# Patient Record
Sex: MALE | Race: OTHER | HISPANIC OR LATINO | URBAN - METROPOLITAN AREA
[De-identification: names, ages, dates, MRNs, and addresses within clinical notes are randomized per-mention and may not be internally consistent; named-entity substitution may affect disease eponyms.]

---

## 2020-07-08 ENCOUNTER — EMERGENCY (EMERGENCY)
Facility: HOSPITAL | Age: 50
LOS: 1 days | Discharge: HOME | End: 2020-07-08
Attending: EMERGENCY MEDICINE
Payer: MEDICAID

## 2020-07-08 VITALS
TEMPERATURE: 98 F | HEART RATE: 72 BPM | RESPIRATION RATE: 20 BRPM | SYSTOLIC BLOOD PRESSURE: 154 MMHG | DIASTOLIC BLOOD PRESSURE: 87 MMHG | WEIGHT: 235.89 LBS | HEIGHT: 68 IN | OXYGEN SATURATION: 99 %

## 2020-07-08 PROCEDURE — 99282 EMERGENCY DEPT VISIT SF MDM: CPT

## 2020-07-08 NOTE — ED ADULT NURSE NOTE - NSIMPLEMENTINTERV_GEN_ALL_ED
Implemented All Universal Safety Interventions:  [place] to call system. Call bell, personal items and telephone within reach. Instruct patient to call for assistance. Room bathroom lighting operational. Non-slip footwear when patient is off stretcher. Physically safe environment: no spills, clutter or unnecessary equipment. Stretcher in lowest position, wheels locked, appropriate side rails in place.

## 2020-07-08 NOTE — ED PROVIDER NOTE - OBJECTIVE STATEMENT
49 year old male presents to ED for assistance making an outpatient appointment for a neurosurgeon.  Patient reports that he has been having left lower back pain and mid lumbar back pain x1-2 months.  Patient states that he went to his primary care provider who told him to make an appointment with a neurosurgeon based on his MRI results but states that he was unable to make an appointment so his PCP referred him to the ED for assistance in making an outpatient appointment.  Patient denies heavy lifting or  recent trauma.  Patient denies numbness or tingling in extremities, denies loss of bladder of bowel function, denies fevers or chills and denies saddle anesthesia.  Is able to ambulate. Denies any acute symptoms.    Writer spoke to MIGUEL Monreal ([phone]) who confirmed that she went the patient to the ED to get assistance in patient seeing neurosurgery. MIGUEL Monreal states that she has no acute concerns with patient and patient only needs outpatient follow-up. 49 year old male presents to ED for assistance making an outpatient appointment for a neurosurgeon.  Patient reports that he has been having left lower back pain and mid lumbar back pain x1-2 months.  Patient states that he went to his primary care provider who told him to make an appointment with a neurosurgeon based on his MRI results but states that he was unable to make an appointment so his PCP referred him to the ED for assistance in making an outpatient appointment.  Patient denies heavy lifting or  recent trauma.  Patient denies numbness or tingling in extremities, denies loss of bladder of bowel function, denies fevers or chills and denies saddle anesthesia.  Is able to ambulate. Denies any acute symptoms.    NP spoke to MIGUEL Monreal ([phone]) who confirmed that she went the patient to the ED to get assistance in patient seeing neurosurgery. MIGUEL Monreal states that she has no acute concerns with patient and patient only needs outpatient follow-up. 49 year old male presents to ED for assistance making an outpatient appointment for a neurosurgeon.  Patient reports that he has been having left lower back pain and mid lumbar back pain x1-2 months.  Patient states that he went to his primary care provider who told him to make an appointment with a neurosurgeon based on his MRI results but states that he was unable to make an appointment so his PCP referred him to the ED for assistance in making an outpatient appointment.  Patient denies heavy lifting or  recent trauma.  Patient denies numbness or tingling in extremities, denies loss of bladder of bowel function, denies fevers or chills and denies saddle anesthesia.  Is able to ambulate. Denies any acute symptoms.    Writer spoke to MIGUEL Monreal ([phone]) who confirmed that she went the patient to the ED to get assistance in patient seeing neurosurgery. MIGUEL Monreal states that she has no acute concerns with patient and patient only needs outpatient follow-up.

## 2020-07-08 NOTE — ED PROVIDER NOTE - PHYSICAL EXAMINATION
mid lumbar tenderness on palpation  5/5 strength in all extremities, able to ambulate, no neurovascular compromise.

## 2020-07-08 NOTE — ED PROVIDER NOTE - CLINICAL SUMMARY MEDICAL DECISION MAKING FREE TEXT BOX
Patient presents for assistance getting neurosurgery appointment.  No acute concerns.  Will arange for care coordinator to contact patient as well as provide patient with list of neurosurgeons. Writer endorsed to MIGUEL Monreal this plan and she is agreeable.  Patient also agreeable to plan. Patient presents for assistance getting neurosurgery appointment.  No acute concerns.  Will arrange for care coordinator to contact patient as well as provide patient with list of neurosurgeons. Writer endorsed to MIGUEL Monreal this plan and she is agreeable.  Patient also agreeable to plan.

## 2020-07-08 NOTE — ED ADULT TRIAGE NOTE - CHIEF COMPLAINT QUOTE
Lt lower back pain worsening x2 months, pt states "i'm unable to follow up with orthopedic doctor" Lt lower back pain worsening x2 months, pt states "I need neuro surgery but i'm unable to follow up with specialist"

## 2020-07-08 NOTE — ED PROVIDER NOTE - ATTENDING CONTRIBUTION TO CARE
50 yo M presents to ED for assistance for arranging neurosurgery followup for chronic low back pain. Was sent in by FNP and patient with MRI report showing multiple bulging discs. F/u arranged for patient and referrals placed for care coordinator. patient without acute pain at this time. Nontoxic and medically stable for discharge. Return precautions provided and patient understands to return to the ED for worsening signs and symptoms.     Dr. CASANDRA Patrick:  I have independently evaluated the patient and have documented in the appropriate sections above.  I agree with the exam and plan as noted above.

## 2020-07-08 NOTE — ED PROVIDER NOTE - PATIENT PORTAL LINK FT
You can access the FollowMyHealth Patient Portal offered by [facility] by registering at the following website: [email or web address]. By joining [employer]’s FollowMyHealth portal, you will also be able to view your health information using other applications (apps) compatible with our system.

## 2021-03-26 NOTE — ED ADULT NURSE NOTE - EXTENSIONS OF SELF_ADULT
[FreeTextEntry1] : PCP: Dr. Foley\par Pt goes by: Harsh\par grandmother: Adrianna Chino\par \par This is a 25 year-old RH male with a history of polysubstance abuse (last smoked fentanyl and abused Xanax 4-6mg/day in Dec 2020, still smoking cannabis), HTN, RUE thrombus during prolonged hospitalization 12/2020 (was on AC, but not discharged with AC) who is referred by PCP Dr. Foley for evaluation and management of seizure-like activity. Pt today is accompanied by his grandmother. \par \par Pt has a long history of fentanyl and Xanax abuse. About 4-5 years ago, whenever he abruptly comes off of the two drugs, he would have these seizure-like episodes consisted of whole-body shakiness, eyes rolled up, jaws locking up, but without impairment of awareness. These episodes would occur up to few minutes at a time, up to 3-4 a day. He has been told that this is “dystonia” secondary to drug withdrawal.\par \par Last December, pt had a prolonged and complicated hospitalization in [place] with sepsis secondary to severe case of pneumonia and pneumothorax. Pt was deprived of fentanyl and Xanax during this hospitalization, so he again exhibited similar episodes of seizure-like events. He was therefore started on VPA for presumed seizure. Also developed RUE thrombus (unclear arterial VS venous) during this hospitalization. Pt was treated with anticoagulant in the hospital, but he was not instructed to continue this outpatient. \par \par Pt denied ever other seizure-like events, including staring spells, lapse of time, LOC, involuntary focal jerking/shaking, convulsion, metallic/foul smell or taste, GI rising sensation, intense gely vu, unexplained anxiety or fear.\par \par SEIZURE RISK FACTORS:\par Polysubstance abuse and withdrawal. Patient was a product of normal pregnancy and delivery. No history of febrile seizure, TBI, CNS infection, or FH of seizures.\par \par CURRENT AEDs:\par GBP 400mg TID - 12/2020, for polysubstance addiction\par VPA DR 1500mg BID - 12/2020, for presumed seizure\par \par PREVIOUS AED: \par none\par \par IMAGING: \par Not sure if ever had MRI\par \par NEUROPHYSIOLOGY:\par rEEG 3/25/21 (370): mild gen slowing\par \par NEUROPSYCHOLOGY: \par none\par \par PMH:\par polysubstance abuse\par HTN\par \par PSH:\par s/p thoracostomy and removal\par s/p tracheostomy and removal\par s/p PEG and removal None

## 2022-12-20 NOTE — ED ADULT NURSE NOTE - CHPI ED NUR QUALITY2
Please see refill request, will eprescribe upon approval.  Pt is taking 2 capsules at bed time.  Pt is asking for 60 capsules.   deep pain

## 2023-10-19 NOTE — ED ADULT NURSE NOTE - CAS TRG GENERAL NORM CIRC DET
Strong peripheral pulses You can access the FollowMyHealth Patient Portal offered by [facility] by registering at the following website: [email or web address]. By joining [employer]’s FollowMyHealth portal, you will also be able to view your health information using other applications (apps) compatible with our system.

## 2023-12-10 ENCOUNTER — INPATIENT (INPATIENT)
Facility: HOSPITAL | Age: 53
LOS: 0 days | Discharge: HOME | DRG: 74 | End: 2023-12-11
Attending: INTERNAL MEDICINE | Admitting: INTERNAL MEDICINE
Payer: MEDICAID

## 2023-12-10 VITALS
HEART RATE: 65 BPM | WEIGHT: 248.9 LBS | SYSTOLIC BLOOD PRESSURE: 151 MMHG | RESPIRATION RATE: 18 BRPM | OXYGEN SATURATION: 98 % | TEMPERATURE: 99 F | DIASTOLIC BLOOD PRESSURE: 87 MMHG

## 2023-12-10 DIAGNOSIS — R74.01 ELEVATION OF LEVELS OF LIVER TRANSAMINASE LEVELS: ICD-10-CM

## 2023-12-10 DIAGNOSIS — Z29.9 ENCOUNTER FOR PROPHYLACTIC MEASURES, UNSPECIFIED: ICD-10-CM

## 2023-12-10 DIAGNOSIS — I63.9 CEREBRAL INFARCTION, UNSPECIFIED: ICD-10-CM

## 2023-12-10 DIAGNOSIS — R29.810 FACIAL WEAKNESS: ICD-10-CM

## 2023-12-10 DIAGNOSIS — G51.0 BELL'S PALSY: ICD-10-CM

## 2023-12-10 LAB
ALBUMIN SERPL ELPH-MCNC: 3.5 G/DL — SIGNIFICANT CHANGE UP (ref 3.5–5)
ALBUMIN SERPL ELPH-MCNC: 3.5 G/DL — SIGNIFICANT CHANGE UP (ref 3.5–5)
ALP SERPL-CCNC: 121 U/L — HIGH (ref 40–120)
ALP SERPL-CCNC: 121 U/L — HIGH (ref 40–120)
ALT FLD-CCNC: 97 U/L DA — HIGH (ref 10–60)
ALT FLD-CCNC: 97 U/L DA — HIGH (ref 10–60)
ANION GAP SERPL CALC-SCNC: 5 MMOL/L — SIGNIFICANT CHANGE UP (ref 5–17)
ANION GAP SERPL CALC-SCNC: 5 MMOL/L — SIGNIFICANT CHANGE UP (ref 5–17)
APTT BLD: 41.3 SEC — HIGH (ref 24.5–35.6)
APTT BLD: 41.3 SEC — HIGH (ref 24.5–35.6)
AST SERPL-CCNC: 49 U/L — HIGH (ref 10–40)
AST SERPL-CCNC: 49 U/L — HIGH (ref 10–40)
BASOPHILS # BLD AUTO: 0.04 K/UL — SIGNIFICANT CHANGE UP (ref 0–0.2)
BASOPHILS # BLD AUTO: 0.04 K/UL — SIGNIFICANT CHANGE UP (ref 0–0.2)
BASOPHILS NFR BLD AUTO: 0.5 % — SIGNIFICANT CHANGE UP (ref 0–2)
BASOPHILS NFR BLD AUTO: 0.5 % — SIGNIFICANT CHANGE UP (ref 0–2)
BILIRUB SERPL-MCNC: 0.4 MG/DL — SIGNIFICANT CHANGE UP (ref 0.2–1.2)
BILIRUB SERPL-MCNC: 0.4 MG/DL — SIGNIFICANT CHANGE UP (ref 0.2–1.2)
BUN SERPL-MCNC: 10 MG/DL — SIGNIFICANT CHANGE UP (ref 7–18)
BUN SERPL-MCNC: 10 MG/DL — SIGNIFICANT CHANGE UP (ref 7–18)
CALCIUM SERPL-MCNC: 8.3 MG/DL — LOW (ref 8.4–10.5)
CALCIUM SERPL-MCNC: 8.3 MG/DL — LOW (ref 8.4–10.5)
CHLORIDE SERPL-SCNC: 107 MMOL/L — SIGNIFICANT CHANGE UP (ref 96–108)
CHLORIDE SERPL-SCNC: 107 MMOL/L — SIGNIFICANT CHANGE UP (ref 96–108)
CO2 SERPL-SCNC: 25 MMOL/L — SIGNIFICANT CHANGE UP (ref 22–31)
CO2 SERPL-SCNC: 25 MMOL/L — SIGNIFICANT CHANGE UP (ref 22–31)
CREAT SERPL-MCNC: 0.76 MG/DL — SIGNIFICANT CHANGE UP (ref 0.5–1.3)
CREAT SERPL-MCNC: 0.76 MG/DL — SIGNIFICANT CHANGE UP (ref 0.5–1.3)
EGFR: 110 ML/MIN/1.73M2 — SIGNIFICANT CHANGE UP
EGFR: 110 ML/MIN/1.73M2 — SIGNIFICANT CHANGE UP
EOSINOPHIL # BLD AUTO: 0.23 K/UL — SIGNIFICANT CHANGE UP (ref 0–0.5)
EOSINOPHIL # BLD AUTO: 0.23 K/UL — SIGNIFICANT CHANGE UP (ref 0–0.5)
EOSINOPHIL NFR BLD AUTO: 3.1 % — SIGNIFICANT CHANGE UP (ref 0–6)
EOSINOPHIL NFR BLD AUTO: 3.1 % — SIGNIFICANT CHANGE UP (ref 0–6)
GLUCOSE SERPL-MCNC: 152 MG/DL — HIGH (ref 70–99)
GLUCOSE SERPL-MCNC: 152 MG/DL — HIGH (ref 70–99)
HCT VFR BLD CALC: 42 % — SIGNIFICANT CHANGE UP (ref 39–50)
HCT VFR BLD CALC: 42 % — SIGNIFICANT CHANGE UP (ref 39–50)
HGB BLD-MCNC: 14.3 G/DL — SIGNIFICANT CHANGE UP (ref 13–17)
HGB BLD-MCNC: 14.3 G/DL — SIGNIFICANT CHANGE UP (ref 13–17)
IMM GRANULOCYTES NFR BLD AUTO: 0.3 % — SIGNIFICANT CHANGE UP (ref 0–0.9)
IMM GRANULOCYTES NFR BLD AUTO: 0.3 % — SIGNIFICANT CHANGE UP (ref 0–0.9)
INR BLD: 1.07 RATIO — SIGNIFICANT CHANGE UP (ref 0.85–1.18)
INR BLD: 1.07 RATIO — SIGNIFICANT CHANGE UP (ref 0.85–1.18)
LYMPHOCYTES # BLD AUTO: 3.62 K/UL — HIGH (ref 1–3.3)
LYMPHOCYTES # BLD AUTO: 3.62 K/UL — HIGH (ref 1–3.3)
LYMPHOCYTES # BLD AUTO: 48.5 % — HIGH (ref 13–44)
LYMPHOCYTES # BLD AUTO: 48.5 % — HIGH (ref 13–44)
MCHC RBC-ENTMCNC: 28.1 PG — SIGNIFICANT CHANGE UP (ref 27–34)
MCHC RBC-ENTMCNC: 28.1 PG — SIGNIFICANT CHANGE UP (ref 27–34)
MCHC RBC-ENTMCNC: 34 GM/DL — SIGNIFICANT CHANGE UP (ref 32–36)
MCHC RBC-ENTMCNC: 34 GM/DL — SIGNIFICANT CHANGE UP (ref 32–36)
MCV RBC AUTO: 82.7 FL — SIGNIFICANT CHANGE UP (ref 80–100)
MCV RBC AUTO: 82.7 FL — SIGNIFICANT CHANGE UP (ref 80–100)
MONOCYTES # BLD AUTO: 0.46 K/UL — SIGNIFICANT CHANGE UP (ref 0–0.9)
MONOCYTES # BLD AUTO: 0.46 K/UL — SIGNIFICANT CHANGE UP (ref 0–0.9)
MONOCYTES NFR BLD AUTO: 6.2 % — SIGNIFICANT CHANGE UP (ref 2–14)
MONOCYTES NFR BLD AUTO: 6.2 % — SIGNIFICANT CHANGE UP (ref 2–14)
NEUTROPHILS # BLD AUTO: 3.09 K/UL — SIGNIFICANT CHANGE UP (ref 1.8–7.4)
NEUTROPHILS # BLD AUTO: 3.09 K/UL — SIGNIFICANT CHANGE UP (ref 1.8–7.4)
NEUTROPHILS NFR BLD AUTO: 41.4 % — LOW (ref 43–77)
NEUTROPHILS NFR BLD AUTO: 41.4 % — LOW (ref 43–77)
NRBC # BLD: 0 /100 WBCS — SIGNIFICANT CHANGE UP (ref 0–0)
NRBC # BLD: 0 /100 WBCS — SIGNIFICANT CHANGE UP (ref 0–0)
PLATELET # BLD AUTO: 294 K/UL — SIGNIFICANT CHANGE UP (ref 150–400)
PLATELET # BLD AUTO: 294 K/UL — SIGNIFICANT CHANGE UP (ref 150–400)
POTASSIUM SERPL-MCNC: 3.6 MMOL/L — SIGNIFICANT CHANGE UP (ref 3.5–5.3)
POTASSIUM SERPL-MCNC: 3.6 MMOL/L — SIGNIFICANT CHANGE UP (ref 3.5–5.3)
POTASSIUM SERPL-SCNC: 3.6 MMOL/L — SIGNIFICANT CHANGE UP (ref 3.5–5.3)
POTASSIUM SERPL-SCNC: 3.6 MMOL/L — SIGNIFICANT CHANGE UP (ref 3.5–5.3)
PROT SERPL-MCNC: 7.6 G/DL — SIGNIFICANT CHANGE UP (ref 6–8.3)
PROT SERPL-MCNC: 7.6 G/DL — SIGNIFICANT CHANGE UP (ref 6–8.3)
PROTHROM AB SERPL-ACNC: 12.2 SEC — SIGNIFICANT CHANGE UP (ref 9.5–13)
PROTHROM AB SERPL-ACNC: 12.2 SEC — SIGNIFICANT CHANGE UP (ref 9.5–13)
RBC # BLD: 5.08 M/UL — SIGNIFICANT CHANGE UP (ref 4.2–5.8)
RBC # BLD: 5.08 M/UL — SIGNIFICANT CHANGE UP (ref 4.2–5.8)
RBC # FLD: 13.4 % — SIGNIFICANT CHANGE UP (ref 10.3–14.5)
RBC # FLD: 13.4 % — SIGNIFICANT CHANGE UP (ref 10.3–14.5)
SODIUM SERPL-SCNC: 137 MMOL/L — SIGNIFICANT CHANGE UP (ref 135–145)
SODIUM SERPL-SCNC: 137 MMOL/L — SIGNIFICANT CHANGE UP (ref 135–145)
TROPONIN I, HIGH SENSITIVITY RESULT: 33.4 NG/L — SIGNIFICANT CHANGE UP
TROPONIN I, HIGH SENSITIVITY RESULT: 33.4 NG/L — SIGNIFICANT CHANGE UP
WBC # BLD: 7.46 K/UL — SIGNIFICANT CHANGE UP (ref 3.8–10.5)
WBC # BLD: 7.46 K/UL — SIGNIFICANT CHANGE UP (ref 3.8–10.5)
WBC # FLD AUTO: 7.46 K/UL — SIGNIFICANT CHANGE UP (ref 3.8–10.5)
WBC # FLD AUTO: 7.46 K/UL — SIGNIFICANT CHANGE UP (ref 3.8–10.5)

## 2023-12-10 PROCEDURE — 99285 EMERGENCY DEPT VISIT HI MDM: CPT

## 2023-12-10 PROCEDURE — 70498 CT ANGIOGRAPHY NECK: CPT | Mod: 26,MA

## 2023-12-10 PROCEDURE — 70496 CT ANGIOGRAPHY HEAD: CPT | Mod: 26,MA

## 2023-12-10 PROCEDURE — 0042T: CPT | Mod: MA

## 2023-12-10 PROCEDURE — 70450 CT HEAD/BRAIN W/O DYE: CPT | Mod: 26,59,MA

## 2023-12-10 RX ORDER — ASPIRIN/CALCIUM CARB/MAGNESIUM 324 MG
324 TABLET ORAL ONCE
Refills: 0 | Status: COMPLETED | OUTPATIENT
Start: 2023-12-10 | End: 2023-12-10

## 2023-12-10 RX ORDER — ATORVASTATIN CALCIUM 80 MG/1
80 TABLET, FILM COATED ORAL AT BEDTIME
Refills: 0 | Status: DISCONTINUED | OUTPATIENT
Start: 2023-12-10 | End: 2023-12-11

## 2023-12-10 RX ORDER — ASPIRIN/CALCIUM CARB/MAGNESIUM 324 MG
300 TABLET ORAL DAILY
Refills: 0 | Status: DISCONTINUED | OUTPATIENT
Start: 2023-12-10 | End: 2023-12-10

## 2023-12-10 RX ORDER — ACETAMINOPHEN 500 MG
650 TABLET ORAL EVERY 6 HOURS
Refills: 0 | Status: DISCONTINUED | OUTPATIENT
Start: 2023-12-10 | End: 2023-12-11

## 2023-12-10 RX ORDER — ASPIRIN/CALCIUM CARB/MAGNESIUM 324 MG
81 TABLET ORAL DAILY
Refills: 0 | Status: DISCONTINUED | OUTPATIENT
Start: 2023-12-11 | End: 2023-12-11

## 2023-12-10 RX ORDER — ENOXAPARIN SODIUM 100 MG/ML
40 INJECTION SUBCUTANEOUS EVERY 24 HOURS
Refills: 0 | Status: DISCONTINUED | OUTPATIENT
Start: 2023-12-10 | End: 2023-12-11

## 2023-12-10 RX ORDER — VALACYCLOVIR 500 MG/1
1000 TABLET, FILM COATED ORAL EVERY 8 HOURS
Refills: 0 | Status: DISCONTINUED | OUTPATIENT
Start: 2023-12-10 | End: 2023-12-11

## 2023-12-10 RX ORDER — OXYCODONE HYDROCHLORIDE 5 MG/1
2.5 TABLET ORAL ONCE
Refills: 0 | Status: DISCONTINUED | OUTPATIENT
Start: 2023-12-10 | End: 2023-12-10

## 2023-12-10 RX ADMIN — OXYCODONE HYDROCHLORIDE 2.5 MILLIGRAM(S): 5 TABLET ORAL at 19:03

## 2023-12-10 RX ADMIN — VALACYCLOVIR 1000 MILLIGRAM(S): 500 TABLET, FILM COATED ORAL at 19:00

## 2023-12-10 RX ADMIN — Medication 324 MILLIGRAM(S): at 18:00

## 2023-12-10 RX ADMIN — ATORVASTATIN CALCIUM 80 MILLIGRAM(S): 80 TABLET, FILM COATED ORAL at 21:11

## 2023-12-10 RX ADMIN — Medication 60 MILLIGRAM(S): at 19:00

## 2023-12-10 RX ADMIN — OXYCODONE HYDROCHLORIDE 2.5 MILLIGRAM(S): 5 TABLET ORAL at 18:00

## 2023-12-10 NOTE — H&P ADULT - PROBLEM SELECTOR PLAN 1
p/w Rt sided facial weakness with upper part of the face involved  CT head neg for acute findings  CTA: 4 mL of tissue at risk for infarction in the right temporal lobe on CT perfusion. Normal CTA of the head and neck. No large vessel occlusion.   CVA vs Bell's palsy   s/p ASA  c/w ASA, Atorvastatin   dysphagia screen passed  c/w tele, vitals and neuro checks q4  f/u lipid panel, HbA1c, TSH  f/u Echo w/ bubble study  Neuro Dr. Carr consult p/w Rt sided facial weakness with upper part of the face involved  CT head neg for acute findings  CTA: 4 mL of tissue at risk for infarction in the right temporal lobe on CT perfusion. Normal CTA of the head and neck. No large vessel occlusion.   CVA vs Bell's palsy   s/p ASA  c/w ASA, Atorvastatin   dysphagia screen passed  c/w tele, vitals and neuro checks q4  f/u lipid panel, HbA1c, TSH  f/u Echo w/ bubble study  Neuro Dr. Carr consulted

## 2023-12-10 NOTE — H&P ADULT - PROBLEM SELECTOR PLAN 2
p/w Rt sided facial weakness with upper part of the face involved  likely Bell's palsy   c/w Prednisone 60 mg daily for 7 days  c/w Valacyclovir 1000 mg TID for 7 days

## 2023-12-10 NOTE — ED ADULT NURSE NOTE - EXTENSIONS OF SELF_ADULT
Had leak of vascular stent. This was repaired by IR. Subsequently developed pancreatitis hospitalized GB evaluation was negative No EtOH On HCT for BP on metformin last visit HCT was stopped due to pancreatitis 11/19/2020 constipation  took laxative now cramping and loose stool wt loss 18 pounds no n/v decreased appetite None

## 2023-12-10 NOTE — ED PROVIDER NOTE - CRANIAL NERVE AND PUPILLARY EXAM
right facial droop. remainder of cranial nerves intact./central and peripheral vision intact/extra-ocular movements intact/tongue is midline

## 2023-12-10 NOTE — H&P ADULT - NSHPREVIEWOFSYSTEMS_GEN_ALL_CORE
REVIEW OF SYSTEMS:    CONSTITUTIONAL: No weakness, fevers or chills  EYES/ENT: No visual changes;  No vertigo or throat pain   NECK: No pain or stiffness  RESPIRATORY: No cough, wheezing, hemoptysis; No shortness of breath  CARDIOVASCULAR: No chest pain or palpitations  GASTROINTESTINAL: No abdominal or epigastric pain. No nausea, vomiting, or hematemesis; No diarrhea or constipation. No melena or hematochezia.  GENITOURINARY: No dysuria, frequency or hematuria  NEUROLOGICAL: No numbness or weakness in the UE/LE, + facial droop on Rt side affecting the eye, no slurred speech   SKIN: No itching, rashes

## 2023-12-10 NOTE — H&P ADULT - HISTORY OF PRESENT ILLNESS
52 yrs old M, from home, ambulating independently, pmhx of HLD not on medication, pshx appendectomy, presented with Rt sided facial weakness, and posterior headache. Pt endorsed having right sided facial weakness, including decreased strength at the level of the eye unable to fully close along with facial droop noticed by his wife at about 3 PM. He reported of having dull headache in the posterior side of the head behind his right ear for the past 3 days without improvement on ibuprofen. Pt denied having rash on the area of pain, trouble swallowing, slurring of speech, decreased sensation or strength in the upper or lower extremity. chest pain, palpitation, dyspnea, abdominal pain, N/V/D, dysuria/ increased frequency or urgency. He denied hx of lyme disease, camping or visiting [place]y area, recent travel or sick contacts, could not recall if had chicken pox in childhood, endorsed having chronic weak urinary stream and difficulty with complete evacuation.   Family history postive for DM in [place]. He reported consuming alcohol socially, smoking 1-2 cigarettes once a month, denied marijuana or illicit drug use.

## 2023-12-10 NOTE — H&P ADULT - NSHPLABSRESULTS_GEN_ALL_CORE
ACC: [id number] EXAM:  CT ANGIO BRAIN STROKE PROTC IC   ORDERED BY: HINA SIMS     ACC: [id number] EXAM:  CT ANGIO NECK STROKE PROTCL IC   ORDERED BY: HINA SIMS     ACC: [id number] EXAM:  CT BRAIN PERFUSION MAPS STROKE   ORDERED BY: HINA SIMS     PROCEDURE DATE:  12/10/2023          INTERPRETATION:  Clinical indication: Code stroke    CT PERFUSION:    After the intravenous administration of 40 cc of Omnipaque 350 serial   thin sections were obtained through the brain for the purposes of  evaluating CT perfusion.    Rapid images were sent to the rapid ischemia view software for   postprocessing.    No core infarct is identified. There is 4 cc of tissue with delayed mean   transit time in the right temporal lobe.    CBF<30% volume: 0ml  Tmax. 6.0s volume; 4 ml  Mismatch volume:  4 ml  Mismatch ratio:infinite    CTA head and neck:        After the intravenous power injection of 90 cc of Omnipaque 350 using a   bolus sharda timing run serial thin sections were obtained through the   neck from the thoracic inlet through the intracranial circulation   centered at the [id number]-hx-Oelkjj on a multislice CT scanner reformatted   with coronal and sagittal 2 D-MIP projections, including 3 D   reconstructions using a separate 3D Vit[employer] software workstation.A total   of   cc of Omnipaque were intravenously injected. 70  cc were discarded.        The origins of the carotid and vertebral arteries are normal. The   vertebral arteries are codominant. The carotid bifurcations are normal,   the left internal carotid artery takes a retropharyngeal course which is   a normal variation.     The distal vertebral arteries are well identified as are the   posterior-inferior cerebellar arteries bilaterally. The region of the   vertebral basilar junction is normal. The basilar artery is fenestrated   without an aneurysm. The posterior cerebral and superior cerebellar   arteries are normal.    Distal cervical, petrous cavernous and supraclinoid internal carotid   arteries. The anterior cerebral arteries, anterior communicating artery   and middle cerebral arteries are normal.      There is no evidence of aneurysm, stenosis, or vessel occlusion.    The normal intracranial venous circulation is identified. The left   transverse sinus is dominant. The superior sagittal sinus, internal   cerebral veins, vein of Pawel, straight sinus, transverse sinuses,   sigmoid sinuses and internal jugular veins are normal. Cortical veins are   normal.    IMPRESSION: 4 mL of tissue at risk for infarction in the right temporal   lobe on CT perfusion. Normal CTA of the head and neck. No large vessel   occlusion.    --- End of Report ---            BRITT VILA MD; Attending Radiologist  This document has been electronically signed. Dec 10 2023  2:34PM ACC: [id number] EXAM:  CT ANGIO BRAIN STROKE PROTC IC   ORDERED BY: HINA SIMS     ACC: [id number] EXAM:  CT ANGIO NECK STROKE PROTCL IC   ORDERED BY: HINA SIMS     ACC: [id number] EXAM:  CT BRAIN PERFUSION MAPS STROKE   ORDERED BY: HINA SIMS     PROCEDURE DATE:  12/10/2023          INTERPRETATION:  Clinical indication: Code stroke    CT PERFUSION:    After the intravenous administration of 40 cc of Omnipaque 350 serial   thin sections were obtained through the brain for the purposes of  evaluating CT perfusion.    Rapid images were sent to the rapid ischemia view software for   postprocessing.    No core infarct is identified. There is 4 cc of tissue with delayed mean   transit time in the right temporal lobe.    CBF<30% volume: 0ml  Tmax. 6.0s volume; 4 ml  Mismatch volume:  4 ml  Mismatch ratio:infinite    CTA head and neck:        After the intravenous power injection of 90 cc of Omnipaque 350 using a   bolus sharda timing run serial thin sections were obtained through the   neck from the thoracic inlet through the intracranial circulation   centered at the [id number]-[id number]-Eaqkaz on a multislice CT scanner reformatted   with coronal and sagittal 2 D-MIP projections, including 3 D   reconstructions using a separate 3D Vit[employer] software workstation.A total   of   cc of Omnipaque were intravenously injected. 70  cc were discarded.        The origins of the carotid and vertebral arteries are normal. The   vertebral arteries are codominant. The carotid bifurcations are normal,   the left internal carotid artery takes a retropharyngeal course which is   a normal variation.     The distal vertebral arteries are well identified as are the   posterior-inferior cerebellar arteries bilaterally. The region of the   vertebral basilar junction is normal. The basilar artery is fenestrated   without an aneurysm. The posterior cerebral and superior cerebellar   arteries are normal.    Distal cervical, petrous cavernous and supraclinoid internal carotid   arteries. The anterior cerebral arteries, anterior communicating artery   and middle cerebral arteries are normal.      There is no evidence of aneurysm, stenosis, or vessel occlusion.    The normal intracranial venous circulation is identified. The left   transverse sinus is dominant. The superior sagittal sinus, internal   cerebral veins, vein of Pawel, straight sinus, transverse sinuses,   sigmoid sinuses and internal jugular veins are normal. Cortical veins are   normal.    IMPRESSION: 4 mL of tissue at risk for infarction in the right temporal   lobe on CT perfusion. Normal CTA of the head and neck. No large vessel   occlusion.    --- End of Report ---            BRITT VILA MD; Attending Radiologist  This document has been electronically signed. Dec 10 2023  2:34PM

## 2023-12-10 NOTE — ED ADULT NURSE NOTE - NSFALLUNIVINTERV_ED_ALL_ED
Bed/Stretcher in lowest position, wheels locked, appropriate side rails in place/Call bell, personal items and telephone in reach/Instruct patient to call for assistance before getting out of bed/chair/stretcher/Non-slip footwear applied when patient is off stretcher/[place] to call system/Physically safe environment - no spills, clutter or unnecessary equipment/Purposeful proactive rounding/Room/bathroom lighting operational, light cord in reach Bed/Stretcher in lowest position, wheels locked, appropriate side rails in place/Call bell, personal items and telephone in reach/Instruct patient to call for assistance before getting out of bed/chair/stretcher/Non-slip footwear applied when patient is off stretcher/[place] to call system/Physically safe environment - no spills, clutter or unnecessary equipment/Purposeful proactive rounding/Room/bathroom lighting operational, light cord in reach

## 2023-12-10 NOTE — H&P ADULT - ASSESSMENT
52 yrs old M, from home, ambulating independently, pmhx of HLD not on medication, pshx appendectomy, presented with Rt sided facial weakness, and posterior headache. Pt endorsed having right sided facial weakness, including decreased strength at the level of the eye unable to fully close along with facial droop noticed by his wife at about 3 PM. CT head Unremarkable noncontrast CT of the brain. No intracranial hemorrhage. CT angio brain showed  4 mL of tissue at risk for infarction in the right temporal lobe on CT perfusion. Normal CTA of the head and neck. No large vessel occlusion. Pt is admitted for CVA evaluation and management of possible Bell's palsy.

## 2023-12-10 NOTE — H&P ADULT - ATTENDING COMMENTS
Pt's presentation appears very consistent with Pt's presentation appears clinically very consistent with Bell's Palsy considering the sole fact that the upper face is also involved. Given the fact that the peripheral motor neuron for the upper face (on each side) is innervated by the upper motor neurons of both cortical hemispheres a CVA as etiology for it deficits is highly unlikely (to be upper motor neuron , but rather lower). Started pt on empiric Acyclovir and Prednisone and will monitor for improvement (which can take months in many cases). Neurology consult also called, given CT brain findings.

## 2023-12-10 NOTE — ED PROVIDER NOTE - PROGRESS NOTE DETAILS
labs grossly unremarkable. ecg NSR, RRR, no acute ischemic changes. ct perfusion with area at risk for ischemia right parietal lobe. will admit for neuro eval and MRI.

## 2023-12-10 NOTE — H&P ADULT - NSHPPHYSICALEXAM_GEN_ALL_CORE
GENERAL: NAD, lying in bed comfortably, obese  HEAD:  Atraumatic, Normocephalic  EYES: EOMI, PERRLA, conjunctiva and sclera clear  ENT: Moist mucous membranes  NECK: Supple, No JVD  CHEST/LUNG: Clear to auscultation bilaterally; No rales, rhonchi, wheezing, or rubs. Unlabored respirations  HEART: Regular rate and rhythm; No murmurs, rubs, or gallops  ABDOMEN: Bowel sounds present; Soft, Nontender, Nondistended.   EXTREMITIES:  2+ Peripheral Pulses, brisk capillary refill. No clubbing, cyanosis, or edema  NERVOUS SYSTEM:  Alert & Oriented X3, speech clear. flattening of nasolabial fold, Rt sided facial droop, normal CN II-XII except CN VII, no pronator drift, normal UE and LE 5/5 strength, sensation intact in bilateral UE and LE, normal alternating movement of the hand, normal finger to nose, normal heel to shin, neutral babinski bilateral,   MSK: FROM all 4 extremities, full and equal strength  SKIN: No rashes or lesions

## 2023-12-10 NOTE — ED ADULT NURSE NOTE - OBJECTIVE STATEMENT
CORBY MCQUEEN COVERING NOTES: AOX4 +ambulatory patient reports R sided facial droop around 3PM x yesterday. Patient also complaining of R sided headache. Denies any weakness no numbness or tingling sensations.

## 2023-12-10 NOTE — ED PROVIDER NOTE - OBJECTIVE STATEMENT
52 year old male denies PMH coming in with right sided facial droop since last night. pt states for the past few days with right posterior head pain for which he took tylenol and excedrin with improvement but since last night noticed right side of his face wasn't moving and he was having intermittent right eye blurriness. pt states never had symptoms like this before. denies all other complaints.

## 2023-12-11 VITALS
OXYGEN SATURATION: 98 % | RESPIRATION RATE: 18 BRPM | TEMPERATURE: 98 F | HEART RATE: 67 BPM | SYSTOLIC BLOOD PRESSURE: 121 MMHG | DIASTOLIC BLOOD PRESSURE: 98 MMHG

## 2023-12-11 LAB
A1C WITH ESTIMATED AVERAGE GLUCOSE RESULT: 7.3 % — HIGH (ref 4–5.6)
A1C WITH ESTIMATED AVERAGE GLUCOSE RESULT: 7.3 % — HIGH (ref 4–5.6)
ALBUMIN SERPL ELPH-MCNC: 3.3 G/DL — LOW (ref 3.5–5)
ALBUMIN SERPL ELPH-MCNC: 3.3 G/DL — LOW (ref 3.5–5)
ALP SERPL-CCNC: 93 U/L — SIGNIFICANT CHANGE UP (ref 40–120)
ALP SERPL-CCNC: 93 U/L — SIGNIFICANT CHANGE UP (ref 40–120)
ALT FLD-CCNC: 96 U/L DA — HIGH (ref 10–60)
ALT FLD-CCNC: 96 U/L DA — HIGH (ref 10–60)
ANION GAP SERPL CALC-SCNC: 5 MMOL/L — SIGNIFICANT CHANGE UP (ref 5–17)
ANION GAP SERPL CALC-SCNC: 5 MMOL/L — SIGNIFICANT CHANGE UP (ref 5–17)
AST SERPL-CCNC: 42 U/L — HIGH (ref 10–40)
AST SERPL-CCNC: 42 U/L — HIGH (ref 10–40)
BASOPHILS # BLD AUTO: 0.03 K/UL — SIGNIFICANT CHANGE UP (ref 0–0.2)
BASOPHILS # BLD AUTO: 0.03 K/UL — SIGNIFICANT CHANGE UP (ref 0–0.2)
BASOPHILS NFR BLD AUTO: 0.3 % — SIGNIFICANT CHANGE UP (ref 0–2)
BASOPHILS NFR BLD AUTO: 0.3 % — SIGNIFICANT CHANGE UP (ref 0–2)
BILIRUB SERPL-MCNC: 0.6 MG/DL — SIGNIFICANT CHANGE UP (ref 0.2–1.2)
BILIRUB SERPL-MCNC: 0.6 MG/DL — SIGNIFICANT CHANGE UP (ref 0.2–1.2)
BUN SERPL-MCNC: 10 MG/DL — SIGNIFICANT CHANGE UP (ref 7–18)
BUN SERPL-MCNC: 10 MG/DL — SIGNIFICANT CHANGE UP (ref 7–18)
CALCIUM SERPL-MCNC: 8.2 MG/DL — LOW (ref 8.4–10.5)
CALCIUM SERPL-MCNC: 8.2 MG/DL — LOW (ref 8.4–10.5)
CHLORIDE SERPL-SCNC: 106 MMOL/L — SIGNIFICANT CHANGE UP (ref 96–108)
CHLORIDE SERPL-SCNC: 106 MMOL/L — SIGNIFICANT CHANGE UP (ref 96–108)
CHOLEST SERPL-MCNC: 202 MG/DL — HIGH
CHOLEST SERPL-MCNC: 202 MG/DL — HIGH
CO2 SERPL-SCNC: 25 MMOL/L — SIGNIFICANT CHANGE UP (ref 22–31)
CO2 SERPL-SCNC: 25 MMOL/L — SIGNIFICANT CHANGE UP (ref 22–31)
CREAT SERPL-MCNC: 0.68 MG/DL — SIGNIFICANT CHANGE UP (ref 0.5–1.3)
CREAT SERPL-MCNC: 0.68 MG/DL — SIGNIFICANT CHANGE UP (ref 0.5–1.3)
EGFR: 113 ML/MIN/1.73M2 — SIGNIFICANT CHANGE UP
EGFR: 113 ML/MIN/1.73M2 — SIGNIFICANT CHANGE UP
EOSINOPHIL # BLD AUTO: 0.02 K/UL — SIGNIFICANT CHANGE UP (ref 0–0.5)
EOSINOPHIL # BLD AUTO: 0.02 K/UL — SIGNIFICANT CHANGE UP (ref 0–0.5)
EOSINOPHIL NFR BLD AUTO: 0.2 % — SIGNIFICANT CHANGE UP (ref 0–6)
EOSINOPHIL NFR BLD AUTO: 0.2 % — SIGNIFICANT CHANGE UP (ref 0–6)
ESTIMATED AVERAGE GLUCOSE: 163 MG/DL — HIGH (ref 68–114)
ESTIMATED AVERAGE GLUCOSE: 163 MG/DL — HIGH (ref 68–114)
GLUCOSE SERPL-MCNC: 147 MG/DL — HIGH (ref 70–99)
GLUCOSE SERPL-MCNC: 147 MG/DL — HIGH (ref 70–99)
HCT VFR BLD CALC: 42.8 % — SIGNIFICANT CHANGE UP (ref 39–50)
HCT VFR BLD CALC: 42.8 % — SIGNIFICANT CHANGE UP (ref 39–50)
HDLC SERPL-MCNC: 45 MG/DL — SIGNIFICANT CHANGE UP
HDLC SERPL-MCNC: 45 MG/DL — SIGNIFICANT CHANGE UP
HGB BLD-MCNC: 14.7 G/DL — SIGNIFICANT CHANGE UP (ref 13–17)
HGB BLD-MCNC: 14.7 G/DL — SIGNIFICANT CHANGE UP (ref 13–17)
IMM GRANULOCYTES NFR BLD AUTO: 0.3 % — SIGNIFICANT CHANGE UP (ref 0–0.9)
IMM GRANULOCYTES NFR BLD AUTO: 0.3 % — SIGNIFICANT CHANGE UP (ref 0–0.9)
LIPID PNL WITH DIRECT LDL SERPL: 138 MG/DL — HIGH
LIPID PNL WITH DIRECT LDL SERPL: 138 MG/DL — HIGH
LYMPHOCYTES # BLD AUTO: 3.03 K/UL — SIGNIFICANT CHANGE UP (ref 1–3.3)
LYMPHOCYTES # BLD AUTO: 3.03 K/UL — SIGNIFICANT CHANGE UP (ref 1–3.3)
LYMPHOCYTES # BLD AUTO: 32.1 % — SIGNIFICANT CHANGE UP (ref 13–44)
LYMPHOCYTES # BLD AUTO: 32.1 % — SIGNIFICANT CHANGE UP (ref 13–44)
MAGNESIUM SERPL-MCNC: 2.1 MG/DL — SIGNIFICANT CHANGE UP (ref 1.6–2.6)
MAGNESIUM SERPL-MCNC: 2.1 MG/DL — SIGNIFICANT CHANGE UP (ref 1.6–2.6)
MCHC RBC-ENTMCNC: 27.7 PG — SIGNIFICANT CHANGE UP (ref 27–34)
MCHC RBC-ENTMCNC: 27.7 PG — SIGNIFICANT CHANGE UP (ref 27–34)
MCHC RBC-ENTMCNC: 34.3 GM/DL — SIGNIFICANT CHANGE UP (ref 32–36)
MCHC RBC-ENTMCNC: 34.3 GM/DL — SIGNIFICANT CHANGE UP (ref 32–36)
MCV RBC AUTO: 80.6 FL — SIGNIFICANT CHANGE UP (ref 80–100)
MCV RBC AUTO: 80.6 FL — SIGNIFICANT CHANGE UP (ref 80–100)
MONOCYTES # BLD AUTO: 0.4 K/UL — SIGNIFICANT CHANGE UP (ref 0–0.9)
MONOCYTES # BLD AUTO: 0.4 K/UL — SIGNIFICANT CHANGE UP (ref 0–0.9)
MONOCYTES NFR BLD AUTO: 4.2 % — SIGNIFICANT CHANGE UP (ref 2–14)
MONOCYTES NFR BLD AUTO: 4.2 % — SIGNIFICANT CHANGE UP (ref 2–14)
NEUTROPHILS # BLD AUTO: 5.92 K/UL — SIGNIFICANT CHANGE UP (ref 1.8–7.4)
NEUTROPHILS # BLD AUTO: 5.92 K/UL — SIGNIFICANT CHANGE UP (ref 1.8–7.4)
NEUTROPHILS NFR BLD AUTO: 62.9 % — SIGNIFICANT CHANGE UP (ref 43–77)
NEUTROPHILS NFR BLD AUTO: 62.9 % — SIGNIFICANT CHANGE UP (ref 43–77)
NON HDL CHOLESTEROL: 157 MG/DL — HIGH
NON HDL CHOLESTEROL: 157 MG/DL — HIGH
NRBC # BLD: 0 /100 WBCS — SIGNIFICANT CHANGE UP (ref 0–0)
NRBC # BLD: 0 /100 WBCS — SIGNIFICANT CHANGE UP (ref 0–0)
PHOSPHATE SERPL-MCNC: 3.4 MG/DL — SIGNIFICANT CHANGE UP (ref 2.5–4.5)
PHOSPHATE SERPL-MCNC: 3.4 MG/DL — SIGNIFICANT CHANGE UP (ref 2.5–4.5)
PLATELET # BLD AUTO: 304 K/UL — SIGNIFICANT CHANGE UP (ref 150–400)
PLATELET # BLD AUTO: 304 K/UL — SIGNIFICANT CHANGE UP (ref 150–400)
POTASSIUM SERPL-MCNC: 3.7 MMOL/L — SIGNIFICANT CHANGE UP (ref 3.5–5.3)
POTASSIUM SERPL-MCNC: 3.7 MMOL/L — SIGNIFICANT CHANGE UP (ref 3.5–5.3)
POTASSIUM SERPL-SCNC: 3.7 MMOL/L — SIGNIFICANT CHANGE UP (ref 3.5–5.3)
POTASSIUM SERPL-SCNC: 3.7 MMOL/L — SIGNIFICANT CHANGE UP (ref 3.5–5.3)
PROT SERPL-MCNC: 7.7 G/DL — SIGNIFICANT CHANGE UP (ref 6–8.3)
PROT SERPL-MCNC: 7.7 G/DL — SIGNIFICANT CHANGE UP (ref 6–8.3)
RBC # BLD: 5.31 M/UL — SIGNIFICANT CHANGE UP (ref 4.2–5.8)
RBC # BLD: 5.31 M/UL — SIGNIFICANT CHANGE UP (ref 4.2–5.8)
RBC # FLD: 13.5 % — SIGNIFICANT CHANGE UP (ref 10.3–14.5)
RBC # FLD: 13.5 % — SIGNIFICANT CHANGE UP (ref 10.3–14.5)
SODIUM SERPL-SCNC: 136 MMOL/L — SIGNIFICANT CHANGE UP (ref 135–145)
SODIUM SERPL-SCNC: 136 MMOL/L — SIGNIFICANT CHANGE UP (ref 135–145)
TRIGL SERPL-MCNC: 95 MG/DL — SIGNIFICANT CHANGE UP
TRIGL SERPL-MCNC: 95 MG/DL — SIGNIFICANT CHANGE UP
TSH SERPL-MCNC: 1.07 UU/ML — SIGNIFICANT CHANGE UP (ref 0.34–4.82)
TSH SERPL-MCNC: 1.07 UU/ML — SIGNIFICANT CHANGE UP (ref 0.34–4.82)
WBC # BLD: 9.43 K/UL — SIGNIFICANT CHANGE UP (ref 3.8–10.5)
WBC # BLD: 9.43 K/UL — SIGNIFICANT CHANGE UP (ref 3.8–10.5)
WBC # FLD AUTO: 9.43 K/UL — SIGNIFICANT CHANGE UP (ref 3.8–10.5)
WBC # FLD AUTO: 9.43 K/UL — SIGNIFICANT CHANGE UP (ref 3.8–10.5)

## 2023-12-11 PROCEDURE — 93306 TTE W/DOPPLER COMPLETE: CPT | Mod: 26

## 2023-12-11 PROCEDURE — 99221 1ST HOSP IP/OBS SF/LOW 40: CPT

## 2023-12-11 PROCEDURE — 85025 COMPLETE CBC W/AUTO DIFF WBC: CPT

## 2023-12-11 PROCEDURE — 70498 CT ANGIOGRAPHY NECK: CPT | Mod: MA

## 2023-12-11 PROCEDURE — 93005 ELECTROCARDIOGRAM TRACING: CPT

## 2023-12-11 PROCEDURE — 83036 HEMOGLOBIN GLYCOSYLATED A1C: CPT

## 2023-12-11 PROCEDURE — 80061 LIPID PANEL: CPT

## 2023-12-11 PROCEDURE — 84443 ASSAY THYROID STIM HORMONE: CPT

## 2023-12-11 PROCEDURE — 70496 CT ANGIOGRAPHY HEAD: CPT | Mod: MA

## 2023-12-11 PROCEDURE — 85610 PROTHROMBIN TIME: CPT

## 2023-12-11 PROCEDURE — 84100 ASSAY OF PHOSPHORUS: CPT

## 2023-12-11 PROCEDURE — 84484 ASSAY OF TROPONIN QUANT: CPT

## 2023-12-11 PROCEDURE — 80053 COMPREHEN METABOLIC PANEL: CPT

## 2023-12-11 PROCEDURE — 36415 COLL VENOUS BLD VENIPUNCTURE: CPT

## 2023-12-11 PROCEDURE — 83735 ASSAY OF MAGNESIUM: CPT

## 2023-12-11 PROCEDURE — 99285 EMERGENCY DEPT VISIT HI MDM: CPT

## 2023-12-11 PROCEDURE — 85730 THROMBOPLASTIN TIME PARTIAL: CPT

## 2023-12-11 PROCEDURE — 70450 CT HEAD/BRAIN W/O DYE: CPT | Mod: MA

## 2023-12-11 PROCEDURE — 0042T: CPT | Mod: MA

## 2023-12-11 PROCEDURE — 93306 TTE W/DOPPLER COMPLETE: CPT

## 2023-12-11 PROCEDURE — 82962 GLUCOSE BLOOD TEST: CPT

## 2023-12-11 RX ORDER — PANTOPRAZOLE SODIUM 20 MG/1
40 TABLET, DELAYED RELEASE ORAL
Refills: 0 | Status: DISCONTINUED | OUTPATIENT
Start: 2023-12-11 | End: 2023-12-11

## 2023-12-11 RX ORDER — IBUPROFEN 200 MG
600 TABLET ORAL ONCE
Refills: 0 | Status: DISCONTINUED | OUTPATIENT
Start: 2023-12-11 | End: 2023-12-11

## 2023-12-11 RX ORDER — INFLUENZA VIRUS VACCINE 15; 15; 15; 15 UG/.5ML; UG/.5ML; UG/.5ML; UG/.5ML
0.5 SUSPENSION INTRAMUSCULAR ONCE
Refills: 0 | Status: DISCONTINUED | OUTPATIENT
Start: 2023-12-11 | End: 2023-12-11

## 2023-12-11 RX ORDER — VALACYCLOVIR 500 MG/1
1 TABLET, FILM COATED ORAL
Qty: 18 | Refills: 0
Start: 2023-12-11 | End: 2023-12-16

## 2023-12-11 RX ADMIN — VALACYCLOVIR 1000 MILLIGRAM(S): 500 TABLET, FILM COATED ORAL at 15:32

## 2023-12-11 RX ADMIN — Medication 81 MILLIGRAM(S): at 11:59

## 2023-12-11 RX ADMIN — ENOXAPARIN SODIUM 40 MILLIGRAM(S): 100 INJECTION SUBCUTANEOUS at 09:55

## 2023-12-11 RX ADMIN — VALACYCLOVIR 1000 MILLIGRAM(S): 500 TABLET, FILM COATED ORAL at 06:21

## 2023-12-11 NOTE — CONSULT NOTE ADULT - SUBJECTIVE AND OBJECTIVE BOX
To be completed History from Admission H&P yesterday.    <Start of quote(s) from H&P>  "Reason for Admission: CVA vs Bell's palsy  History of Present Illness:   52 yrs old M, from home, ambulating independently, pmhx of HLD not on medication, pshx appendectomy, presented with Rt sided facial weakness, and posterior headache. Pt endorsed having right sided facial weakness, including decreased strength at the level of the eye unable to fully close along with facial droop noticed by his wife at about 3 PM. He reported of having dull headache in the posterior side of the head behind his right ear for the past 3 days without improvement on ibuprofen. Pt denied having rash on the area of pain, trouble swallowing, slurring of speech, decreased sensation or strength in the upper or lower extremity. chest pain, palpitation, dyspnea, abdominal pain, N/V/D, dysuria/ increased frequency or urgency. He denied hx of lyme disease, camping or visiting woody area, recent travel or sick contacts, could not recall if had chicken pox in childhood, endorsed having chronic weak urinary stream and difficulty with complete evacuation.    . . .     Review of Systems: REVIEW OF SYSTEMS:    CONSTITUTIONAL: No weakness, fevers or chills  EYES/ENT: No visual changes;  No vertigo or throat pain   NECK: No pain or stiffness  RESPIRATORY: No cough, wheezing, hemoptysis; No shortness of breath  CARDIOVASCULAR: No chest pain or palpitations  GASTROINTESTINAL: No abdominal or epigastric pain. No nausea, vomiting, or hematemesis; No diarrhea or constipation. No melena or hematochezia.  GENITOURINARY: No dysuria, frequency or hematuria  NEUROLOGICAL: No numbness or weakness in the UE/LE, + facial droop on Rt side affecting the eye, no slurred speech   SKIN: No itching, rashes   . . .     PAST MEDICAL HISTORY:  HLD (hyperlipidemia).   . . .    · Alcohol use	consuming alcohol socially  · Substance use	denied  · Tobacco use	smoking 1-2 cigarettes once a month"  <End of quote(s) from H&P>      Per radiology report of non-con head CT:  "No prior brain imaging is available for comparison.    There is prominent extra-axial space in the frontal regions.      The fourth, third and lateral ventricles are normal size and position.   There is no hemorrhage, mass or shift of the midline structures. There is   normal gray white matter differentiation. Bone window examination is   unremarkable.    IMPRESSION: Unremarkable noncontrast CT of the brain. No intracranial   hemorrhage."     History from Admission H&P yesterday.    <Start of quote(s) from H&P>  "Reason for Admission: CVA vs Bell's palsy  History of Present Illness:   52 yrs old M, from home, ambulating independently, pmhx of HLD not on medication, pshx appendectomy, presented with Rt sided facial weakness, and posterior headache. Pt endorsed having right sided facial weakness, including decreased strength at the level of the eye unable to fully close along with facial droop noticed by his wife at about 3 PM. He reported of having dull headache in the posterior side of the head behind his right ear for the past 3 days without improvement on ibuprofen. Pt denied having rash on the area of pain, trouble swallowing, slurring of speech, decreased sensation or strength in the upper or lower extremity. chest pain, palpitation, dyspnea, abdominal pain, N/V/D, dysuria/ increased frequency or urgency. He denied hx of lyme disease, camping or visiting woody area, recent travel or sick contacts, could not recall if had chicken pox in childhood, endorsed having chronic weak urinary stream and difficulty with complete evacuation.    . . .     Review of Systems: REVIEW OF SYSTEMS:    CONSTITUTIONAL: No weakness, fevers or chills  EYES/ENT: No visual changes;  No vertigo or throat pain   NECK: No pain or stiffness  RESPIRATORY: No cough, wheezing, hemoptysis; No shortness of breath  CARDIOVASCULAR: No chest pain or palpitations  GASTROINTESTINAL: No abdominal or epigastric pain. No nausea, vomiting, or hematemesis; No diarrhea or constipation. No melena or hematochezia.  GENITOURINARY: No dysuria, frequency or hematuria  NEUROLOGICAL: No numbness or weakness in the UE/LE, + facial droop on Rt side affecting the eye, no slurred speech   SKIN: No itching, rashes   . . .     PAST MEDICAL HISTORY:  HLD (hyperlipidemia).   . . .    · Alcohol use	consuming alcohol socially  · Substance use	denied  · Tobacco use	smoking 1-2 cigarettes once a month"  <End of quote(s) from H&P>      Per radiology report of non-con head CT:  "No prior brain imaging is available for comparison.    There is prominent extra-axial space in the frontal regions.      The fourth, third and lateral ventricles are normal size and position.   There is no hemorrhage, mass or shift of the midline structures. There is   normal gray white matter differentiation. Bone window examination is   unremarkable.    IMPRESSION: Unremarkable noncontrast CT of the brain. No intracranial   hemorrhage."    Per radiology re[prt pf CTP, and CTAs of head and neck:  "CT PERFUSION:    After the intravenous administration of 40 cc of Omnipaque 350 serial   thin sections were obtained through the brain for the purposes of  evaluating CT perfusion.    Rapid images were sent to the rapid ischemia view software for   postprocessing.    No core infarct is identified. There is 4 cc of tissue with delayed mean   transit time in the right temporal lobe.    CBF<30% volume: 0ml  Tmax. 6.0s volume; 4 ml  Mismatch volume:  4 ml  Mismatch ratio:infinite   . . .   The origins of the carotid and vertebral arteries are normal. The   vertebral arteries are codominant. The carotid bifurcations are normal,   the left internal carotid artery takes a retropharyngeal course which is   a normal variation.     The distal vertebral arteries are well identified as are the   posterior-inferior cerebellar arteries bilaterally. The region of the   vertebral basilar junction is normal. The basilar artery is fenestrated   without an aneurysm. The posterior cerebral and superior cerebellar   arteries are normal.    Distal cervical, petrous cavernous and supraclinoid internal carotid   arteries. The anterior cerebral arteries, anterior communicating artery   and middle cerebral arteries are normal.      There is no evidence of aneurysm, stenosis, or vessel occlusion.    The normal intracranial venous circulation is identified. The left   transverse sinus is dominant. The superior sagittal sinus, internal   cerebral veins, vein of Pawel, straight sinus, transverse sinuses,   sigmoid sinuses and internal jugular veins are normal. Cortical veins are   normal.    IMPRESSION: 4 mL of tissue at risk for infarction in the right temporal   lobe on CT perfusion. Normal CTA of the head and neck. No large vessel   occlusion."    EXAMINATION     History from Admission H&P yesterday.    <Start of quote(s) from H&P>  "Reason for Admission: CVA vs Bell's palsy  History of Present Illness:   52 yrs old M, from home, ambulating independently, pmhx of HLD not on medication, pshx appendectomy, presented with Rt sided facial weakness, and posterior headache. Pt endorsed having right sided facial weakness, including decreased strength at the level of the eye unable to fully close along with facial droop noticed by his wife at about 3 PM. He reported of having dull headache in the posterior side of the head behind his right ear for the past 3 days without improvement on ibuprofen. Pt denied having rash on the area of pain, trouble swallowing, slurring of speech, decreased sensation or strength in the upper or lower extremity. chest pain, palpitation, dyspnea, abdominal pain, N/V/D, dysuria/ increased frequency or urgency. He denied hx of lyme disease, camping or visiting woody area, recent travel or sick contacts, could not recall if had chicken pox in childhood, endorsed having chronic weak urinary stream and difficulty with complete evacuation.    . . .     Review of Systems: REVIEW OF SYSTEMS:    CONSTITUTIONAL: No weakness, fevers or chills  EYES/ENT: No visual changes;  No vertigo or throat pain   NECK: No pain or stiffness  RESPIRATORY: No cough, wheezing, hemoptysis; No shortness of breath  CARDIOVASCULAR: No chest pain or palpitations  GASTROINTESTINAL: No abdominal or epigastric pain. No nausea, vomiting, or hematemesis; No diarrhea or constipation. No melena or hematochezia.  GENITOURINARY: No dysuria, frequency or hematuria  NEUROLOGICAL: No numbness or weakness in the UE/LE, + facial droop on Rt side affecting the eye, no slurred speech   SKIN: No itching, rashes   . . .     PAST MEDICAL HISTORY:  HLD (hyperlipidemia).   . . .    · Alcohol use	consuming alcohol socially  · Substance use	denied  · Tobacco use	smoking 1-2 cigarettes once a month"  <End of quote(s) from H&P>      Per radiology report of non-con head CT:  "No prior brain imaging is available for comparison.    There is prominent extra-axial space in the frontal regions.      The fourth, third and lateral ventricles are normal size and position.   There is no hemorrhage, mass or shift of the midline structures. There is   normal gray white matter differentiation. Bone window examination is   unremarkable.    IMPRESSION: Unremarkable noncontrast CT of the brain. No intracranial   hemorrhage."    Per radiology re[prt pf CTP, and CTAs of head and neck:  "CT PERFUSION:    After the intravenous administration of 40 cc of Omnipaque 350 serial   thin sections were obtained through the brain for the purposes of  evaluating CT perfusion.    Rapid images were sent to the rapid ischemia view software for   postprocessing.    No core infarct is identified. There is 4 cc of tissue with delayed mean   transit time in the right temporal lobe.    CBF<30% volume: 0ml  Tmax. 6.0s volume; 4 ml  Mismatch volume:  4 ml  Mismatch ratio:infinite   . . .   The origins of the carotid and vertebral arteries are normal. The   vertebral arteries are codominant. The carotid bifurcations are normal,   the left internal carotid artery takes a retropharyngeal course which is   a normal variation.     The distal vertebral arteries are well identified as are the   posterior-inferior cerebellar arteries bilaterally. The region of the   vertebral basilar junction is normal. The basilar artery is fenestrated   without an aneurysm. The posterior cerebral and superior cerebellar   arteries are normal.    Distal cervical, petrous cavernous and supraclinoid internal carotid   arteries. The anterior cerebral arteries, anterior communicating artery   and middle cerebral arteries are normal.      There is no evidence of aneurysm, stenosis, or vessel occlusion.    The normal intracranial venous circulation is identified. The left   transverse sinus is dominant. The superior sagittal sinus, internal   cerebral veins, vein of Pawel, straight sinus, transverse sinuses,   sigmoid sinuses and internal jugular veins are normal. Cortical veins are   normal.    IMPRESSION: 4 mL of tissue at risk for infarction in the right temporal   lobe on CT perfusion. Normal CTA of the head and neck. No large vessel   occlusion."    EXAMINATION    Interviewed by me in American.  Grossly normal comprehension, expression, articulation, prosody in American.  PERRL; confrontation visual fields grossly intact; EOMI.      LMN-type R facial mild/mod weakness (almost but does not complete close R eyelids; minimal brow furrowing).      No UE drift.  Omega symmetric and intact four limbs.  Normal symmetric strngth on confrontation testing of limb muscles.  Grossly normal hearing by finger rub and 2048 Hz fork; no hyperaccusis.      Mild tenderness to palpation of trapezii (more on the L); no TMJ tenderness.      Gait normal.      P/LT sensation grossly intact (including tongue, auricles, retroauricular scalp)  .      History from Admission H&P yesterday.    <Start of quote(s) from H&P>  "Reason for Admission: CVA vs Bell's palsy  History of Present Illness:   52 yrs old M, from home, ambulating independently, pmhx of HLD not on medication, pshx appendectomy, presented with Rt sided facial weakness, and posterior headache. Pt endorsed having right sided facial weakness, including decreased strength at the level of the eye unable to fully close along with facial droop noticed by his wife at about 3 PM. He reported of having dull headache in the posterior side of the head behind his right ear for the past 3 days without improvement on ibuprofen. Pt denied having rash on the area of pain, trouble swallowing, slurring of speech, decreased sensation or strength in the upper or lower extremity. chest pain, palpitation, dyspnea, abdominal pain, N/V/D, dysuria/ increased frequency or urgency. He denied hx of lyme disease, camping or visiting woody area, recent travel or sick contacts, could not recall if had chicken pox in childhood, endorsed having chronic weak urinary stream and difficulty with complete evacuation.    . . .     Review of Systems: REVIEW OF SYSTEMS:    CONSTITUTIONAL: No weakness, fevers or chills  EYES/ENT: No visual changes;  No vertigo or throat pain   NECK: No pain or stiffness  RESPIRATORY: No cough, wheezing, hemoptysis; No shortness of breath  CARDIOVASCULAR: No chest pain or palpitations  GASTROINTESTINAL: No abdominal or epigastric pain. No nausea, vomiting, or hematemesis; No diarrhea or constipation. No melena or hematochezia.  GENITOURINARY: No dysuria, frequency or hematuria  NEUROLOGICAL: No numbness or weakness in the UE/LE, + facial droop on Rt side affecting the eye, no slurred speech   SKIN: No itching, rashes   . . .     PAST MEDICAL HISTORY:  HLD (hyperlipidemia).   . . .    · Alcohol use	consuming alcohol socially  · Substance use	denied  · Tobacco use	smoking 1-2 cigarettes once a month"  <End of quote(s) from H&P>      Per radiology report of non-con head CT:  "No prior brain imaging is available for comparison.    There is prominent extra-axial space in the frontal regions.      The fourth, third and lateral ventricles are normal size and position.   There is no hemorrhage, mass or shift of the midline structures. There is   normal gray white matter differentiation. Bone window examination is   unremarkable.    IMPRESSION: Unremarkable noncontrast CT of the brain. No intracranial   hemorrhage."    Per radiology re[prt pf CTP, and CTAs of head and neck:  "CT PERFUSION:    After the intravenous administration of 40 cc of Omnipaque 350 serial   thin sections were obtained through the brain for the purposes of  evaluating CT perfusion.    Rapid images were sent to the rapid ischemia view software for   postprocessing.    No core infarct is identified. There is 4 cc of tissue with delayed mean   transit time in the right temporal lobe.    CBF<30% volume: 0ml  Tmax. 6.0s volume; 4 ml  Mismatch volume:  4 ml  Mismatch ratio:infinite   . . .   The origins of the carotid and vertebral arteries are normal. The   vertebral arteries are codominant. The carotid bifurcations are normal,   the left internal carotid artery takes a retropharyngeal course which is   a normal variation.     The distal vertebral arteries are well identified as are the   posterior-inferior cerebellar arteries bilaterally. The region of the   vertebral basilar junction is normal. The basilar artery is fenestrated   without an aneurysm. The posterior cerebral and superior cerebellar   arteries are normal.    Distal cervical, petrous cavernous and supraclinoid internal carotid   arteries. The anterior cerebral arteries, anterior communicating artery   and middle cerebral arteries are normal.      There is no evidence of aneurysm, stenosis, or vessel occlusion.    The normal intracranial venous circulation is identified. The left   transverse sinus is dominant. The superior sagittal sinus, internal   cerebral veins, vein of Pawel, straight sinus, transverse sinuses,   sigmoid sinuses and internal jugular veins are normal. Cortical veins are   normal.    IMPRESSION: 4 mL of tissue at risk for infarction in the right temporal   lobe on CT perfusion. Normal CTA of the head and neck. No large vessel   occlusion."    EXAMINATION    Interviewed by me in Thai.  Grossly normal comprehension, expression, articulation, prosody in Thai.  PERRL; confrontation visual fields grossly intact; EOMI.      LMN-type R facial mild/mod weakness (almost but does not complete close R eyelids; minimal brow furrowing).      No UE drift.  Omega symmetric and intact four limbs.  Normal symmetric strngth on confrontation testing of limb muscles.  Grossly normal hearing by finger rub and 2048 Hz fork; no hyperaccusis.      Mild tenderness to palpation of trapezii (more on the L); no TMJ tenderness.      Gait normal.      P/LT sensation grossly intact (including tongue, auricles, retroauricular scalp)  .

## 2023-12-11 NOTE — DISCHARGE NOTE PROVIDER - CARE PROVIDERS DIRECT ADDRESSES
[email or web address]

## 2023-12-11 NOTE — PATIENT PROFILE ADULT - FALL HARM RISK - HARM RISK INTERVENTIONS
Communicate Risk of Fall with Harm to all staff/Reinforce activity limits and safety measures with patient and family/Tailored Fall Risk Interventions/Visual Cue: Yellow wristband and red socks/Bed in lowest position, wheels locked, appropriate side rails in place/Call bell, personal items and telephone in reach/Instruct patient to call for assistance before getting out of bed or chair/Non-slip footwear when patient is out of bed/[place] to call system/Physically safe environment - no spills, clutter or unnecessary equipment/Purposeful Proactive Rounding/Room/bathroom lighting operational, light cord in reach Communicate Risk of Fall with Harm to all staff/Reinforce activity limits and safety measures with patient and family/Tailored Fall Risk Interventions/Visual Cue: Yellow wristband and red socks/Bed in lowest position, wheels locked, appropriate side rails in place/Call bell, personal items and telephone in reach/Instruct patient to call for assistance before getting out of bed or chair/Non-slip footwear when patient is out of bed/[place] to call system/Physically safe environment - no spills, clutter or unnecessary equipment/Purposeful Proactive Rounding/Room/bathroom lighting operational, light cord in reach

## 2023-12-11 NOTE — DISCHARGE NOTE PROVIDER - PROVIDER TOKENS
PROVIDER:[TOKEN:[[id number]],FOLLOWUP:[1 week]] PROVIDER:[TOKEN:[[id number]],FOLLOWUP:[1 week]]

## 2023-12-11 NOTE — PROGRESS NOTE ADULT - ATTENDING COMMENTS
Pt's presentation appears clinically very consistent with Bell's Palsy considering the sole fact that the upper face is also involved. Given the fact that the peripheral motor neuron for the upper face (on each side) is innervated by the upper motor neurons of both cortical hemispheres a CVA as etiology for it deficits is highly unlikely (to be upper motor neuron , but rather lower). Started pt on empiric Acyclovir and Prednisone and will monitor for improvement (which can take months in many cases). Neurology consult also called, given CT brain findings.

## 2023-12-11 NOTE — CONSULT NOTE ADULT - ASSESSMENT
52 yrs old M, from home, ambulating independently, pmhx of HLD not on medication, pshx appendectomy, presented with Rt sided facial weakness, and posterior headache,new dx of DM.  1.Neurology eval.  2.Lipid d.o-statin.  3.DM-Insulin.  4.Cont asa.  5.GI and DVT prophylaxis.

## 2023-12-11 NOTE — PROGRESS NOTE ADULT - PROBLEM SELECTOR PLAN 1
p/w Rt sided facial weakness with upper part of the face involved  CT head neg for acute findings  CTA: 4 mL of tissue at risk for infarction in the right temporal lobe on CT perfusion. Normal CTA of the head and neck. No large vessel occlusion.   CVA vs Bell's palsy   s/p ASA  c/w ASA, Atorvastatin   dysphagia screen passed  c/w tele, vitals and neuro checks q4  f/u lipid panel, HbA1c, TSH  f/u Echo w/ bubble study  Neuro Dr. Carr consulted

## 2023-12-11 NOTE — PATIENT PROFILE ADULT - FLU SEASON?
PATIENT RETURNED MY CALL REGARDING ABNORMAL LABS.  I EXPLAINED THAT HE NEEDS TO SEE HIS PCP DUE TO HIGH CRP & SED RATES.  AND THAT SURGERY WOULD NEED TO BE CANCELLED UNTIL LABS ARE BACK TO NORMAL.  PATIENT VERBALIZED UNDERSTANDING AND WOULD BE CALLING HIS PCP.  
Yes...

## 2023-12-11 NOTE — DISCHARGE NOTE PROVIDER - NSDCMRMEDTOKEN_GEN_ALL_CORE_FT
predniSONE 20 mg oral tablet: 3 tab(s) orally once a day  valACYclovir 1 g oral tablet: 1 tab(s) orally every 8 hours

## 2023-12-11 NOTE — PROGRESS NOTE ADULT - SUBJECTIVE AND OBJECTIVE BOX
AISHA HOLLAND  MR# [id number]  52yMale        Patient is a 52y old  Male who presents with a chief complaint of CVA vs Bell's palsy (10 Dec 2023 15:38)      INTERVAL HPI/OVERNIGHT EVENTS:  Patient seen and examined at bedside. No notations of chest pain, palpitation, SOB, orthopnea, nausea, vomiting or abdominal pain.    ALLERGIES  No Known Allergies      MEDICATIONS  acetaminophen     Tablet .. 650 milliGRAM(s) Oral every 6 hours PRN Temp greater or equal to 38C (100.4F), Mild Pain (1 - 3)  aspirin  chewable 81 milliGRAM(s) Oral daily  atorvastatin 80 milliGRAM(s) Oral at bedtime  enoxaparin Injectable 40 milliGRAM(s) SubCutaneous every 24 hours  influenza   Vaccine 0.5 milliLiter(s) IntraMuscular once  predniSONE   Tablet 60 milliGRAM(s) Oral daily  valACYclovir 1000 milliGRAM(s) Oral every 8 hours              REVIEW OF SYSTEMS:  CONSTITUTIONAL: No fever, weight loss, or fatigue  EYES: No eye pain, visual disturbances, or discharge  ENT:  No difficulty hearing, tinnitus, vertigo; No sinus or throat pain  NECK: No pain or stiffness  RESPIRATORY: No cough, wheezing, chills or hemoptysis; No Shortness of Breath  CARDIOVASCULAR: No chest pain, palpitations, passing out, dizziness, or leg swelling  GASTROINTESTINAL: No abdominal or epigastric pain. No nausea, vomiting, or hematemesis; No diarrhea or constipation. No melena or hematochezia.  GENITOURINARY: No dysuria, frequency, hematuria, or incontinence  NEUROLOGICAL: No headaches, memory loss, loss of strength, numbness, or tremors  SKIN: No itching, burning, rashes, or lesions   LYMPH Nodes: No enlarged glands  ENDOCRINE: No heat or cold intolerance; No hair loss  MUSCULOSKELETAL: No joint pain or swelling; No muscle, back, or extremity pain  PSYCHIATRIC: No depression, anxiety, mood swings, or difficulty sleeping  HEME/LYMPH: No easy bruising, or bleeding gums  ALLERGY AND IMMUNOLOGIC: No hives or eczema	    [ ] All others negative	  [ ] Unable to obtain      T(C): 36.8 (12-11-23 @ 10:52), Max: 37.1 (12-10-23 @ 14:29)  T(F): 98.2 (12-11-23 @ 10:52), Max: 98.7 (12-10-23 @ 14:29)  HR: 70 (12-11-23 @ 10:52) (60 - 70)  BP: 121/91 (12-11-23 @ 10:52) (118/75 - 151/87)  RR: 18 (12-11-23 @ 10:52) (18 - 19)  SpO2: 96% (12-11-23 @ 10:52) (95% - 98%)  Wt(kg): --    Weight (kg): 112.9 (12-10 @ 13:44)  I&O's Summary        PHYSICAL EXAM:  A X O x  HEAD:  Atraumatic, Normocephalic  EYES: EOMI, PERRLA, conjunctiva and sclera clear  NECK: Supple, No JVD, Normal thyroid  Resp: CTAB, No crackles, wheezing,   CVS: Regular rate and rhythm; No discernable murmurs, rubs, or gallops  ABD: Soft, Nontender, Nondistended; Bowel sounds present  EXTREMITIES:  2+ Peripheral Pulses, No edema  LYMPH: No dicernable lymphadenopathy noted  GENERAL: NAD, well-groomed, well-developed      LABS:                        14.7   9.43  )-----------( 304      ( 11 Dec 2023 06:00 )             42.8     12-11    136  |  106  |  10  ----------------------------<  147<H>  3.7   |  25  |  0.68    Ca    8.2<L>      11 Dec 2023 06:00  Phos  3.4     12-11  Mg     2.1     12-11    TPro  7.7  /  Alb  3.3<L>  /  TBili  0.6  /  DBili  x   /  AST  42<H>  /  ALT  96<H>  /  AlkPhos  93  12-11    PT/INR - ( 10 Dec 2023 14:22 )   PT: 12.2 sec;   INR: 1.07 ratio         PTT - ( 10 Dec 2023 14:22 )  PTT:41.3 sec  Urinalysis Basic - ( 11 Dec 2023 06:00 )    Color: x / Appearance: x / SG: x / pH: x  Gluc: 147 mg/dL / Ketone: x  / Bili: x / Urobili: x   Blood: x / Protein: x / Nitrite: x   Leuk Esterase: x / RBC: x / WBC x   Sq Epi: x / Non Sq Epi: x / Bacteria: x      CAPILLARY BLOOD GLUCOSE      POCT Blood Glucose.: 146 mg/dL (10 Dec 2023 13:49)      Troponins:  ProBNP:  Lipid Profile:   HgA1c:  TSH:           RADIOLOGY & ADDITIONAL TESTS:    Imaging Personally Reviewed:  [ ] YES  [ ] NO      Consultant(s) Notes Reviewed:  [x ] YES  [ ] NO    Care Discussed with Consultants/Other Providers [ x] YES  [ ] NO          PAST MEDICAL & SURGICAL HISTORY:  HLD (hyperlipidemia)            Facial weakness    Handoff    MEWS Score    Chronic low back pain    HLD (hyperlipidemia)    Facial droop    Cerebrovascular accident (CVA)    Bell's palsy    Prophylactic measure    Transaminitis    W/RT FACE NUMBNESS, HEADACHE    90+    SysAdmin_VisitLink             AISHA HOLLAND  MR# [id number]  52yMale        Patient is a 52y old  Male who presents with a chief complaint of CVA vs Bell's palsy (10 Dec 2023 15:38)      INTERVAL HPI/OVERNIGHT EVENTS:  Patient seen and examined at bedside. No notations of chest pain, palpitation, SOB, orthopnea, nausea, vomiting or abdominal pain.    ALLERGIES  No Known Allergies      MEDICATIONS  acetaminophen     Tablet .. 650 milliGRAM(s) Oral every 6 hours PRN Temp greater or equal to 38C (100.4F), Mild Pain (1 - 3)  aspirin  chewable 81 milliGRAM(s) Oral daily  atorvastatin 80 milliGRAM(s) Oral at bedtime  enoxaparin Injectable 40 milliGRAM(s) SubCutaneous every 24 hours  influenza   Vaccine 0.5 milliLiter(s) IntraMuscular once  predniSONE   Tablet 60 milliGRAM(s) Oral daily  valACYclovir 1000 milliGRAM(s) Oral every 8 hours              REVIEW OF SYSTEMS:  CONSTITUTIONAL: No fever, weight loss, or fatigue  EYES: No eye pain, visual disturbances, or discharge  ENT:  No difficulty hearing, tinnitus, vertigo; No sinus or throat pain  NECK: No pain or stiffness  RESPIRATORY: No cough, wheezing, chills or hemoptysis; No Shortness of Breath  CARDIOVASCULAR: No chest pain, palpitations, passing out, dizziness, or leg swelling  GASTROINTESTINAL: No abdominal or epigastric pain. No nausea, vomiting, or hematemesis; No diarrhea or constipation. No melena or hematochezia.  GENITOURINARY: No dysuria, frequency, hematuria, or incontinence  NEUROLOGICAL: No headaches, memory loss, loss of strength, numbness, or tremors  SKIN: No itching, burning, rashes, or lesions   LYMPH Nodes: No enlarged glands  ENDOCRINE: No heat or cold intolerance; No hair loss  MUSCULOSKELETAL: No joint pain or swelling; No muscle, back, or extremity pain  PSYCHIATRIC: No depression, anxiety, mood swings, or difficulty sleeping  HEME/LYMPH: No easy bruising, or bleeding gums  ALLERGY AND IMMUNOLOGIC: No hives or eczema	    [ ] All others negative	  [ ] Unable to obtain      T(C): 36.8 (12-11-23 @ 10:52), Max: 37.1 (12-10-23 @ 14:29)  T(F): 98.2 (12-11-23 @ 10:52), Max: 98.7 (12-10-23 @ 14:29)  HR: 70 (12-11-23 @ 10:52) (60 - 70)  BP: 121/91 (12-11-23 @ 10:52) (118/75 - 151/87)  RR: 18 (12-11-23 @ 10:52) (18 - 19)  SpO2: 96% (12-11-23 @ 10:52) (95% - 98%)  Wt(kg): --    Weight (kg): 112.9 (12-10 @ 13:44)  I&O's Summary        PHYSICAL EXAM:  A X O x  HEAD:  Atraumatic, Normocephalic  EYES: EOMI, PERRLA, conjunctiva and sclera clear  NECK: Supple, No JVD, Normal thyroid  Resp: CTAB, No crackles, wheezing,   CVS: Regular rate and rhythm; No discernable murmurs, rubs, or gallops  ABD: Soft, Nontender, Nondistended; Bowel sounds present  EXTREMITIES:  2+ Peripheral Pulses, No edema  LYMPH: No dicernable lymphadenopathy noted  GENERAL: NAD, well-groomed, well-developed      LABS:                        14.7   9.43  )-----------( 304      ( 11 Dec 2023 06:00 )             42.8     12-11    136  |  106  |  10  ----------------------------<  147<H>  3.7   |  25  |  0.68    Ca    8.2<L>      11 Dec 2023 06:00  Phos  3.4     12-11  Mg     2.1     12-11    TPro  7.7  /  Alb  3.3<L>  /  TBili  0.6  /  DBili  x   /  AST  42<H>  /  ALT  96<H>  /  AlkPhos  93  12-11    PT/INR - ( 10 Dec 2023 14:22 )   PT: 12.2 sec;   INR: 1.07 ratio         PTT - ( 10 Dec 2023 14:22 )  PTT:41.3 sec  Urinalysis Basic - ( 11 Dec 2023 06:00 )    Color: x / Appearance: x / SG: x / pH: x  Gluc: 147 mg/dL / Ketone: x  / Bili: x / Urobili: x   Blood: x / Protein: x / Nitrite: x   Leuk Esterase: x / RBC: x / WBC x   Sq Epi: x / Non Sq Epi: x / Bacteria: x      CAPILLARY BLOOD GLUCOSE      POCT Blood Glucose.: 146 mg/dL (10 Dec 2023 13:49)      Troponins:  ProBNP:  Lipid Profile:   HgA1c:  TSH:           RADIOLOGY & ADDITIONAL TESTS:    Imaging Personally Reviewed:  [ ] YES  [ ] NO      Consultant(s) Notes Reviewed:  [x ] YES  [ ] NO    Care Discussed with Consultants/Other Providers [ x] YES  [ ] NO          PAST MEDICAL & SURGICAL HISTORY:  HLD (hyperlipidemia)            Facial weakness    Handoff    MEWS Score    Chronic low back pain    HLD (hyperlipidemia)    Facial droop    Cerebrovascular accident (CVA)    Bell's palsy    Prophylactic measure    Transaminitis    W/RT FACE NUMBNESS, HEADACHE    90+    SysAdmin_VisitLink

## 2023-12-11 NOTE — PROGRESS NOTE ADULT - SUBJECTIVE AND OBJECTIVE BOX
NP Note discussed with  primary attending    Patient is a 52y old  Male who presents with a chief complaint of CVA (11 Dec 2023 12:56)      INTERVAL HPI/OVERNIGHT EVENTS: no new complaints, pt seen at bedside reporting feeling better. Resting comfortably in bed.   Pending echo with bubble study.     MEDICATIONS  (STANDING):  aspirin  chewable 81 milliGRAM(s) Oral daily  atorvastatin 80 milliGRAM(s) Oral at bedtime  enoxaparin Injectable 40 milliGRAM(s) SubCutaneous every 24 hours  influenza   Vaccine 0.5 milliLiter(s) IntraMuscular once  predniSONE   Tablet 60 milliGRAM(s) Oral daily  valACYclovir 1000 milliGRAM(s) Oral every 8 hours    MEDICATIONS  (PRN):  acetaminophen     Tablet .. 650 milliGRAM(s) Oral every 6 hours PRN Temp greater or equal to 38C (100.4F), Mild Pain (1 - 3)      __________________________________________________  REVIEW OF SYSTEMS:    CONSTITUTIONAL: No fever,   EYES: no acute visual disturbances  NECK: No pain or stiffness  RESPIRATORY: No cough; No shortness of breath  CARDIOVASCULAR: No chest pain, no palpitations  GASTROINTESTINAL: No pain. No nausea or vomiting; No diarrhea   NEUROLOGICAL: No headache or numbness, no tremors  MUSCULOSKELETAL: No joint pain, no muscle pain  GENITOURINARY: no dysuria, no frequency, no hesitancy  PSYCHIATRY: no depression , no anxiety  ALL OTHER  ROS negative        Vital Signs Last 24 Hrs  T(C): 36.8 (11 Dec 2023 10:52), Max: 37 (10 Dec 2023 15:31)  T(F): 98.2 (11 Dec 2023 10:52), Max: 98.6 (10 Dec 2023 15:31)  HR: 70 (11 Dec 2023 10:52) (60 - 70)  BP: 121/91 (11 Dec 2023 10:52) (118/75 - 146/86)  BP(mean): 89 (10 Dec 2023 23:02) (89 - 89)  RR: 18 (11 Dec 2023 10:52) (18 - 19)  SpO2: 96% (11 Dec 2023 10:52) (95% - 98%)    Parameters below as of 11 Dec 2023 10:52  Patient On (Oxygen Delivery Method): room air        ________________________________________________  PHYSICAL EXAM:  GENERAL: NAD  HEENT: Normocephalic;  conjunctivae and sclerae clear; moist mucous membranes;   NECK : supple  CHEST/LUNG:  Diminished to auscultation bilaterally with good air entry   HEART: S1 S2  regular; no murmurs, gallops or rubs  ABDOMEN: Soft, Nontender, Nondistended; Bowel sounds present  EXTREMITIES: no cyanosis; no edema; no calf tenderness  SKIN: warm and dry; no rash  NERVOUS SYSTEM:  Awake and alert; Oriented  to place, person and time ; no new deficits    _________________________________________________  LABS:                        14.7   9.43  )-----------( 304      ( 11 Dec 2023 06:00 )             42.8     12-11    136  |  106  |  10  ----------------------------<  147<H>  3.7   |  25  |  0.68    Ca    8.2<L>      11 Dec 2023 06:00  Phos  3.4     12-11  Mg     2.1     12-11    TPro  7.7  /  Alb  3.3<L>  /  TBili  0.6  /  DBili  x   /  AST  42<H>  /  ALT  96<H>  /  AlkPhos  93  12-11    PT/INR - ( 10 Dec 2023 14:22 )   PT: 12.2 sec;   INR: 1.07 ratio         PTT - ( 10 Dec 2023 14:22 )  PTT:41.3 sec  Urinalysis Basic - ( 11 Dec 2023 06:00 )    Color: x / Appearance: x / SG: x / pH: x  Gluc: 147 mg/dL / Ketone: x  / Bili: x / Urobili: x   Blood: x / Protein: x / Nitrite: x   Leuk Esterase: x / RBC: x / WBC x   Sq Epi: x / Non Sq Epi: x / Bacteria: x      CAPILLARY BLOOD GLUCOSE            RADIOLOGY & ADDITIONAL TESTS:    < from: CT Angio Neck Stroke Protocol w/ IV Cont (12.10.23 @ 14:13) >  IMPRESSION: 4 mL of tissue at risk for infarction in the right temporal   lobe on CT perfusion. Normal CTA of the head and neck. No large vessel   occlusion.      < end of copied text >      Imaging Personally Reviewed:  YES/NO    Consultant(s) Notes Reviewed:   YES/ No    Care Discussed with Consultants :     Plan of care was discussed with patient and /or primary care giver; all questions and concerns were addressed and care was aligned with patient's wishes.     NP Note discussed with  primary attending    Patient is a 52y old  Male who presents with a chief complaint of CVA (11 Dec 2023 12:56)      INTERVAL HPI/OVERNIGHT EVENTS: no new complaints, pt seen at bedside reporting feeling better. Resting comfortably in bed.   pending neurology follow up .     MEDICATIONS  (STANDING):  aspirin  chewable 81 milliGRAM(s) Oral daily  atorvastatin 80 milliGRAM(s) Oral at bedtime  enoxaparin Injectable 40 milliGRAM(s) SubCutaneous every 24 hours  influenza   Vaccine 0.5 milliLiter(s) IntraMuscular once  predniSONE   Tablet 60 milliGRAM(s) Oral daily  valACYclovir 1000 milliGRAM(s) Oral every 8 hours    MEDICATIONS  (PRN):  acetaminophen     Tablet .. 650 milliGRAM(s) Oral every 6 hours PRN Temp greater or equal to 38C (100.4F), Mild Pain (1 - 3)      __________________________________________________  REVIEW OF SYSTEMS:    CONSTITUTIONAL: No fever,   EYES: no acute visual disturbances, right facial droop, no wrinkles upon raising the eyebrows   NECK: No pain or stiffness  RESPIRATORY: No cough; No shortness of breath  CARDIOVASCULAR: No chest pain, no palpitations  GASTROINTESTINAL: No pain. No nausea or vomiting; No diarrhea   NEUROLOGICAL: No headache or numbness, no tremors  MUSCULOSKELETAL: No joint pain, no muscle pain,   GENITOURINARY: no dysuria, no frequency, no hesitancy  PSYCHIATRY: no depression , no anxiety  ALL OTHER  ROS negative        Vital Signs Last 24 Hrs  T(C): 36.8 (11 Dec 2023 10:52), Max: 37 (10 Dec 2023 15:31)  T(F): 98.2 (11 Dec 2023 10:52), Max: 98.6 (10 Dec 2023 15:31)  HR: 70 (11 Dec 2023 10:52) (60 - 70)  BP: 121/91 (11 Dec 2023 10:52) (118/75 - 146/86)  BP(mean): 89 (10 Dec 2023 23:02) (89 - 89)  RR: 18 (11 Dec 2023 10:52) (18 - 19)  SpO2: 96% (11 Dec 2023 10:52) (95% - 98%)    Parameters below as of 11 Dec 2023 10:52  Patient On (Oxygen Delivery Method): room air        ________________________________________________  PHYSICAL EXAM:  GENERAL: NAD  HEENT: loss if the left nasolabial fold and inability to completely close the left eye  no wrinkles upon raising the eyebrow   NECK : supple  CHEST/LUNG:  Diminished to auscultation bilaterally with good air entry   HEART: S1 S2  regular; no murmurs, gallops or rubs  ABDOMEN: Soft, Nontender, Nondistended; Bowel sounds present  EXTREMITIES: no cyanosis; no edema; no calf tenderness  SKIN: warm and dry; no rash  NERVOUS SYSTEM:  Awake and alert; Oriented  to place, person and time ; no new deficits    _________________________________________________  LABS:                        14.7   9.43  )-----------( 304      ( 11 Dec 2023 06:00 )             42.8     12-11    136  |  106  |  10  ----------------------------<  147<H>  3.7   |  25  |  0.68    Ca    8.2<L>      11 Dec 2023 06:00  Phos  3.4     12-11  Mg     2.1     12-11    TPro  7.7  /  Alb  3.3<L>  /  TBili  0.6  /  DBili  x   /  AST  42<H>  /  ALT  96<H>  /  AlkPhos  93  12-11    PT/INR - ( 10 Dec 2023 14:22 )   PT: 12.2 sec;   INR: 1.07 ratio         PTT - ( 10 Dec 2023 14:22 )  PTT:41.3 sec  Urinalysis Basic - ( 11 Dec 2023 06:00 )    Color: x / Appearance: x / SG: x / pH: x  Gluc: 147 mg/dL / Ketone: x  / Bili: x / Urobili: x   Blood: x / Protein: x / Nitrite: x   Leuk Esterase: x / RBC: x / WBC x   Sq Epi: x / Non Sq Epi: x / Bacteria: x      CAPILLARY BLOOD GLUCOSE            RADIOLOGY & ADDITIONAL TESTS:    < from: CT Angio Neck Stroke Protocol w/ IV Cont (12.10.23 @ 14:13) >  IMPRESSION: 4 mL of tissue at risk for infarction in the right temporal   lobe on CT perfusion. Normal CTA of the head and neck. No large vessel   occlusion.      < end of copied text >      Imaging Personally Reviewed:  YES/NO    Consultant(s) Notes Reviewed:   YES/ No    Care Discussed with Consultants :     Plan of care was discussed with patient and /or primary care giver; all questions and concerns were addressed and care was aligned with patient's wishes.

## 2023-12-11 NOTE — PROGRESS NOTE ADULT - PROBLEM SELECTOR PLAN 1
p/w Rt sided facial weakness with upper part of the face involved  CT head neg for acute findings  CTA: 4 mL of tissue at risk for infarction in the right temporal lobe on CT perfusion. Normal CTA of the head and neck. No large vessel occlusion.   CVA vs Bell's palsy   c/w ASA, Atorvastatin   neuro checks q4  EF: 57 %  Neurology Dr Lopez dconsulted p/w Rt sided facial weakness with upper part of the face involved  CT head neg for acute findings  CTA: 4 mL of tissue at risk for infarction in the right temporal lobe on CT perfusion. Normal CTA of the head and neck. No large vessel occlusion.   CVA vs Bell's palsy   c/w ASA, Atorvastatin   neuro checks q4  EF: 57 %  Neurology Dr Lopez consulted

## 2023-12-11 NOTE — CONSULT NOTE ADULT - ASSESSMENT
R facial palsy, LMN-type, with retroauricular pain preceding motor symptoms, and facial weakness that evolved over several hours.  Classic case of Bell's palsy.    Unrelated finding on CTP of questionable significance.  Follow-up out-Pt with neurology.      Continue valacyclovir and prednisone for a total of 7 days.        D/C ASA.    There is no neurologic indication for statin Rx.  He should have fasting lipid level as routine screening.     R facial palsy, LMN-type, with retroauricular pain preceding motor symptoms, and facial weakness that evolved over several hours.  Classic case of Bell's palsy.    Unrelated finding on CTP of questionable significance.  Follow-up out-Pt with neurology.      Continue valacyclovir and prednisone for a total of 7 days.        D/C ASA.    There is no neurologic indication for statin Rx.  He should have fasting lipid level as routine screening.                                                           IMPORTANT  -  PLEASE NOTE:                              I am a neurohospitalist. I do not see patients outside of the hospital.        Patients requiring neurological follow-up after discharge may contact one of the following offices.     [facility] [place]  [address] [place] [address]  [phone]    [facility]  [address].  [place], [place]  [phone]    Megan Lopez M.D.   - Department of Neurology  [employer] [facility]       R facial palsy, LMN-type, with retroauricular pain preceding motor symptoms, and facial weakness that evolved over several hours.  Classic case of Bell's palsy.    Unrelated finding on CTP of questionable significance.  Follow-up out-Pt with neurology.      Continue valacyclovir and prednisone for a total of 7 days.        D/C ASA.    There is no neurologic indication for statin Rx.  He should have fasting lipid level as routine screening.                                                           IMPORTANT  -  PLEASE NOTE:                              I am a neurohospitalist. I do not see patients outside of the hospital.        Patients requiring neurological follow-up after discharge may contact one of the following offices.     [facility] [place]  [address] [place] [address]  [phone]    [facility]  [address].  [place], [place]  [phone]    Megan Lopez M.D.   - Department of Neurology  [employer] at [facility]       R facial palsy, LMN-type, with retroauricular pain preceding motor symptoms, and facial weakness that evolved over several hours.  Classic case of Bell's palsy.    Unrelated finding on CTP of questionable significance.  Follow-up out-Pt with neurology.      Continue valacyclovir and prednisone for a total of 7 days.        D/C ASA.    There is no neurologic indication for statin Rx.  He should have fasting lipid level as routine screening to evaluate whether Rx indicated.                                                           IMPORTANT  -  PLEASE NOTE:                              I am a neurohospitalist. I do not see patients outside of the hospital.        Patients requiring neurological follow-up after discharge may contact one of the following offices.     [facility] [address] [address]  [phone]    [facility]  [address].  [place], [place]  [phone]    Megan Lopez M.D.   - Department of Neurology  [employer] [facility]       R facial palsy, LMN-type, with retroauricular pain preceding motor symptoms, and facial weakness that evolved over several hours.  Classic case of Bell's palsy.    Unrelated finding on CTP of questionable significance.  Follow-up out-Pt with neurology.      Continue valacyclovir and prednisone for a total of 7 days.        D/C ASA.    There is no neurologic indication for statin Rx.  He should have fasting lipid level as routine screening to evaluate whether Rx indicated.                                                           IMPORTANT  -  PLEASE NOTE:                              I am a neurohospitalist. I do not see patients outside of the hospital.        Patients requiring neurological follow-up after discharge may contact one of the following offices.     [facility] [address] [address]  [phone]    [facility]  [address].  [place], [place]  [phone]    Megan Lopez M.D.   - Department of Neurology  [employer] at [facility]

## 2023-12-11 NOTE — DISCHARGE NOTE NURSING/CASE MANAGEMENT/SOCIAL WORK - PATIENT PORTAL LINK FT
You can access the FollowMyHealth Patient Portal offered by [facility] by registering at the following website: [email or web address] By joining [email or web address]’s FollowMyHealth portal, you will also be able to view your health information using other applications (apps) compatible with our system. You can access the FollowMyHealth Patient Portal offered by [facility] by registering at the following website: [email or web address]. By joining [employer]’s FollowMyHealth portal, you will also be able to view your health information using other applications (apps) compatible with our system.

## 2023-12-11 NOTE — DISCHARGE NOTE PROVIDER - CARE PROVIDER_API CALL
Dotty Carr)  Neurology  [address] [place] [address]  Phone: [phone]  Fax: [phone]  Follow Up Time: 1 week   Dotty Carr)  Neurology  [address] [place] [address]  Phone: [phone]  Fax: [phone]  Follow Up Time: 1 week

## 2023-12-11 NOTE — CONSULT NOTE ADULT - SUBJECTIVE AND OBJECTIVE BOX
Date of Service  12-11-23 @ 12:56    CHIEF COMPLAINT:Patient is a 52y old  Male who presents with a chief complaint of CVA.      HPI:  52 yrs old M, from home, ambulating independently, pmhx of HLD not on medication, pshx appendectomy, presented with Rt sided facial weakness, and posterior headache. Pt endorsed having right sided facial weakness, including decreased strength at the level of the eye unable to fully close along with facial droop noticed by his wife at about 3 PM. He reported of having dull headache in the posterior side of the head behind his right ear for the past 3 days without improvement on ibuprofen. Pt denied having rash on the area of pain, trouble swallowing, slurring of speech, decreased sensation or strength in the upper or lower extremity. chest pain, palpitation, dyspnea, abdominal pain, N/V/D, dysuria/ increased frequency or urgency. He denied hx of lyme disease, camping or visiting woody area, recent travel or sick contacts, could not recall if had chicken pox in childhood, endorsed having chronic weak urinary stream and difficulty with complete evacuation.   Family history postive for DM in [place]. He reported consuming alcohol socially, smoking 1-2 cigarettes once a month, denied marijuana or illicit drug use.  (10 Dec 2023 15:38)      PAST MEDICAL & SURGICAL HISTORY:  HLD (hyperlipidemia)          MEDICATIONS  (STANDING):  aspirin  chewable 81 milliGRAM(s) Oral daily  atorvastatin 80 milliGRAM(s) Oral at bedtime  enoxaparin Injectable 40 milliGRAM(s) SubCutaneous every 24 hours  influenza   Vaccine 0.5 milliLiter(s) IntraMuscular once  predniSONE   Tablet 60 milliGRAM(s) Oral daily  valACYclovir 1000 milliGRAM(s) Oral every 8 hours    MEDICATIONS  (PRN):  acetaminophen     Tablet .. 650 milliGRAM(s) Oral every 6 hours PRN Temp greater or equal to 38C (100.4F), Mild Pain (1 - 3)      FAMILY HISTORY:No hx of CAD      SOCIAL HISTORY:    [x ] Non-smoker    [x ] Alcohol-denies    Allergies    No Known Allergies    Intolerances    	    REVIEW OF SYSTEMS:  CONSTITUTIONAL: No fever, weight loss, or fatigue  EYES: No eye pain, visual disturbances, or discharge  ENT:  No difficulty hearing, tinnitus, vertigo; No sinus or throat pain  NECK: No pain or stiffness  RESPIRATORY: No cough, wheezing, chills or hemoptysis; No Shortness of Breath  CARDIOVASCULAR: No chest pain, palpitations, passing out, dizziness, or leg swelling  GASTROINTESTINAL: No abdominal or epigastric pain. No nausea, vomiting, or hematemesis; No diarrhea or constipation. No melena or hematochezia.  GENITOURINARY: No dysuria, frequency, hematuria, or incontinence  NEUROLOGICAL: No headaches, memory loss, loss of strength, numbness, or tremors  SKIN: No itching, burning, rashes, or lesions   LYMPH Nodes: No enlarged glands  ENDOCRINE: No heat or cold intolerance; No hair loss  MUSCULOSKELETAL: No joint pain or swelling; No muscle, back, or extremity pain  PSYCHIATRIC: No depression, anxiety, mood swings, or difficulty sleeping  HEME/LYMPH: No easy bruising, or bleeding gums  ALLERGY AND IMMUNOLOGIC: No hives or eczema	        PHYSICAL EXAM:  T(C): 36.8 (12-11-23 @ 10:52), Max: 37.1 (12-10-23 @ 14:29)  HR: 70 (12-11-23 @ 10:52) (60 - 70)  BP: 121/91 (12-11-23 @ 10:52) (118/75 - 151/87)  RR: 18 (12-11-23 @ 10:52) (18 - 19)  SpO2: 96% (12-11-23 @ 10:52) (95% - 98%)  Wt(kg): --  I&O's Summary      Appearance: Normal	  HEENT:   Normal oral mucosa, PERRL, EOMI	  Lymphatic: No lymphadenopathy  Cardiovascular: Normal S1 S2, No JVD, No murmurs, No edema  Respiratory: Lungs clear to auscultation	  Psychiatry: A & O x 3, Mood & affect appropriate  Gastrointestinal:  Soft, Non-tender, + BS	  Skin: No rashes, No ecchymoses, No cyanosis	  Neurologic: Non-focal  Extremities: Normal range of motion, No clubbing, cyanosis or edema  Vascular: Peripheral pulses palpable 2+ bilaterally        ECG:  	nsr,nl axis    	  LABS:	 	      Troponin I, High Sensitivity Result: 33.4 ng/L (12-10-23 @ 14:22)                          14.7   9.43  )-----------( 304      ( 11 Dec 2023 06:00 )             42.8     12-11    136  |  106  |  10  ----------------------------<  147<H>  3.7   |  25  |  0.68    Ca    8.2<L>      11 Dec 2023 06:00  Phos  3.4     12-11  Mg     2.1     12-11    TPro  7.7  /  Alb  3.3<L>  /  TBili  0.6  /  DBili  x   /  AST  42<H>  /  ALT  96<H>  /  AlkPhos  93  12-11    proBNP:   Lipid Profile: Cholesterol 202  LDL --  HDL 45  TG 95  ldl calc 138  Ratio --    HgA1c:   TSH: Thyroid Stimulating Hormone, Serum: 1.07 uU/mL (12-11 @ 06:00)      < from: CT Angio Neck Stroke Protocol w/ IV Cont (12.10.23 @ 14:13) >  ACC: [id number] EXAM:  CT ANGIO BRAIN STROKE PROTC IC   ORDERED BY: HINA SIMS     ACC: [id number] EXAM:  CT ANGIO NECK STROKE PROTCL IC   ORDERED BY: HINA SIMS     ACC: [id number] EXAM:  CT BRAIN PERFUSION MAPS STROKE   ORDERED BY: HINA SIMS     PROCEDURE DATE:  12/10/2023          INTERPRETATION:  Clinical indication: Code stroke    CT PERFUSION:    After the intravenous administration of 40 cc of Omnipaque 350 serial   thin sections were obtained through the brain for the purposes of  evaluating CT perfusion.    Rapid images were sent to the rapid ischemia view software for   postprocessing.    No core infarct is identified. There is 4 cc of tissue with delayed mean   transit time in the right temporal lobe.    CBF<30% volume: 0ml  Tmax. 6.0s volume; 4 ml  Mismatch volume:  4 ml  Mismatch ratio:infinite    CTA head and neck:        After the intravenous power injection of 90 cc of Omnipaque 350 using a   bolus sharda timing run serial thin sections were obtained through the   neck from the thoracic inlet through the intracranial circulation   centered at the [id number] on a multislice CT scanner reformatted   with coronal and sagittal 2 D-MIP projections, including 3 D   reconstructions using a separate 3D [employer] software workstation.A total   of   cc of Omnipaque were intravenously injected. 70  cc were discarded.        The origins of the carotid and vertebral arteries are normal. The   vertebral arteries are codominant. The carotid bifurcations are normal,   the left internal carotid artery takes a retropharyngeal course which is   a normal variation.     The distal vertebral arteries are well identified as are the   posterior-inferior cerebellar arteries bilaterally. The region of the   vertebral basilar junction is normal. The basilar artery is fenestrated   without an aneurysm. The posterior cerebral and superior cerebellar   arteries are normal.    Distal cervical, petrous cavernous and supraclinoid internal carotid   arteries. The anterior cerebral arteries, anterior communicating artery   and middle cerebral arteries are normal.      There is no evidence of aneurysm, stenosis, or vessel occlusion.    The normal intracranial venous circulation is identified. The left   transverse sinus is dominant. The superior sagittal sinus, internal   cerebral veins, vein of Pawel, straight sinus, transverse sinuses,   sigmoid sinuses and internal jugular veins are normal. Cortical veins are   normal.    IMPRESSION: 4 mL of tissue at risk for infarction in the right temporal   lobe on CT perfusion. Normal CTA of the head and neck. No large vessel   occlusion.    --- End of Report ---            BRITT VILA MD; Attending Radiologist  This document has been electronically signed. Dec 10 2023  2:34PM    < end of copied text >      lyA1C with Estimated Average Glucose Result: 7.3: Method: Immunoassay    Date of Service  12-11-23 @ 12:56    CHIEF COMPLAINT:Patient is a 52y old  Male who presents with a chief complaint of CVA.      HPI:  52 yrs old M, from home, ambulating independently, pmhx of HLD not on medication, pshx appendectomy, presented with Rt sided facial weakness, and posterior headache. Pt endorsed having right sided facial weakness, including decreased strength at the level of the eye unable to fully close along with facial droop noticed by his wife at about 3 PM. He reported of having dull headache in the posterior side of the head behind his right ear for the past 3 days without improvement on ibuprofen. Pt denied having rash on the area of pain, trouble swallowing, slurring of speech, decreased sensation or strength in the upper or lower extremity. chest pain, palpitation, dyspnea, abdominal pain, N/V/D, dysuria/ increased frequency or urgency. He denied hx of lyme disease, camping or visiting woody area, recent travel or sick contacts, could not recall if had chicken pox in childhood, endorsed having chronic weak urinary stream and difficulty with complete evacuation.   Family history postive for DM in [place]. He reported consuming alcohol socially, smoking 1-2 cigarettes once a month, denied marijuana or illicit drug use.  (10 Dec 2023 15:38)      PAST MEDICAL & SURGICAL HISTORY:  HLD (hyperlipidemia)          MEDICATIONS  (STANDING):  aspirin  chewable 81 milliGRAM(s) Oral daily  atorvastatin 80 milliGRAM(s) Oral at bedtime  enoxaparin Injectable 40 milliGRAM(s) SubCutaneous every 24 hours  influenza   Vaccine 0.5 milliLiter(s) IntraMuscular once  predniSONE   Tablet 60 milliGRAM(s) Oral daily  valACYclovir 1000 milliGRAM(s) Oral every 8 hours    MEDICATIONS  (PRN):  acetaminophen     Tablet .. 650 milliGRAM(s) Oral every 6 hours PRN Temp greater or equal to 38C (100.4F), Mild Pain (1 - 3)      FAMILY HISTORY:No hx of CAD      SOCIAL HISTORY:    [x ] Non-smoker    [x ] Alcohol-denies    Allergies    No Known Allergies    Intolerances    	    REVIEW OF SYSTEMS:  CONSTITUTIONAL: No fever, weight loss, or fatigue  EYES: No eye pain, visual disturbances, or discharge  ENT:  No difficulty hearing, tinnitus, vertigo; No sinus or throat pain  NECK: No pain or stiffness  RESPIRATORY: No cough, wheezing, chills or hemoptysis; No Shortness of Breath  CARDIOVASCULAR: No chest pain, palpitations, passing out, dizziness, or leg swelling  GASTROINTESTINAL: No abdominal or epigastric pain. No nausea, vomiting, or hematemesis; No diarrhea or constipation. No melena or hematochezia.  GENITOURINARY: No dysuria, frequency, hematuria, or incontinence  NEUROLOGICAL: No headaches, memory loss, loss of strength, numbness, or tremors  SKIN: No itching, burning, rashes, or lesions   LYMPH Nodes: No enlarged glands  ENDOCRINE: No heat or cold intolerance; No hair loss  MUSCULOSKELETAL: No joint pain or swelling; No muscle, back, or extremity pain  PSYCHIATRIC: No depression, anxiety, mood swings, or difficulty sleeping  HEME/LYMPH: No easy bruising, or bleeding gums  ALLERGY AND IMMUNOLOGIC: No hives or eczema	        PHYSICAL EXAM:  T(C): 36.8 (12-11-23 @ 10:52), Max: 37.1 (12-10-23 @ 14:29)  HR: 70 (12-11-23 @ 10:52) (60 - 70)  BP: 121/91 (12-11-23 @ 10:52) (118/75 - 151/87)  RR: 18 (12-11-23 @ 10:52) (18 - 19)  SpO2: 96% (12-11-23 @ 10:52) (95% - 98%)  Wt(kg): --  I&O's Summary      Appearance: Normal	  HEENT:   Normal oral mucosa, PERRL, EOMI	  Lymphatic: No lymphadenopathy  Cardiovascular: Normal S1 S2, No JVD, No murmurs, No edema  Respiratory: Lungs clear to auscultation	  Psychiatry: A & O x 3, Mood & affect appropriate  Gastrointestinal:  Soft, Non-tender, + BS	  Skin: No rashes, No ecchymoses, No cyanosis	  Neurologic: Non-focal  Extremities: Normal range of motion, No clubbing, cyanosis or edema  Vascular: Peripheral pulses palpable 2+ bilaterally        ECG:  	nsr,nl axis    	  LABS:	 	      Troponin I, High Sensitivity Result: 33.4 ng/L (12-10-23 @ 14:22)                          14.7   9.43  )-----------( 304      ( 11 Dec 2023 06:00 )             42.8     12-11    136  |  106  |  10  ----------------------------<  147<H>  3.7   |  25  |  0.68    Ca    8.2<L>      11 Dec 2023 06:00  Phos  3.4     12-11  Mg     2.1     12-11    TPro  7.7  /  Alb  3.3<L>  /  TBili  0.6  /  DBili  x   /  AST  42<H>  /  ALT  96<H>  /  AlkPhos  93  12-11    proBNP:   Lipid Profile: Cholesterol 202  LDL --  HDL 45  TG 95  ldl calc 138  Ratio --    HgA1c:   TSH: Thyroid Stimulating Hormone, Serum: 1.07 uU/mL (12-11 @ 06:00)      < from: CT Angio Neck Stroke Protocol w/ IV Cont (12.10.23 @ 14:13) >  ACC: [id number] EXAM:  CT ANGIO BRAIN STROKE PROTC IC   ORDERED BY: HINA SIMS     ACC: [id number] EXAM:  CT ANGIO NECK STROKE PROTCL IC   ORDERED BY: HINA SIMS     ACC: [id number] EXAM:  CT BRAIN PERFUSION MAPS STROKE   ORDERED BY: HINA SIMS     PROCEDURE DATE:  12/10/2023          INTERPRETATION:  Clinical indication: Code stroke    CT PERFUSION:    After the intravenous administration of 40 cc of Omnipaque 350 serial   thin sections were obtained through the brain for the purposes of  evaluating CT perfusion.    Rapid images were sent to the rapid ischemia view software for   postprocessing.    No core infarct is identified. There is 4 cc of tissue with delayed mean   transit time in the right temporal lobe.    CBF<30% volume: 0ml  Tmax. 6.0s volume; 4 ml  Mismatch volume:  4 ml  Mismatch ratio:infinite    CTA head and neck:        After the intravenous power injection of 90 cc of Omnipaque 350 using a   bolus sharda timing run serial thin sections were obtained through the   neck from the thoracic inlet through the intracranial circulation   centered at the [id number] on a multislice CT scanner reformatted   with coronal and sagittal 2 D-MIP projections, including 3 D   reconstructions using a separate 3D [employer] software workstation.A total   of   cc of Omnipaque were intravenously injected. 70  cc were discarded.        The origins of the carotid and vertebral arteries are normal. The   vertebral arteries are codominant. The carotid bifurcations are normal,   the left internal carotid artery takes a retropharyngeal course which is   a normal variation.     The distal vertebral arteries are well identified as are the   posterior-inferior cerebellar arteries bilaterally. The region of the   vertebral basilar junction is normal. The basilar artery is fenestrated   without an aneurysm. The posterior cerebral and superior cerebellar   arteries are normal.    Distal cervical, petrous cavernous and supraclinoid internal carotid   arteries. The anterior cerebral arteries, anterior communicating artery   and middle cerebral arteries are normal.      There is no evidence of aneurysm, stenosis, or vessel occlusion.    The normal intracranial venous circulation is identified. The left   transverse sinus is dominant. The superior sagittal sinus, internal   cerebral veins, vein of Pawel, straight sinus, transverse sinuses,   sigmoid sinuses and internal jugular veins are normal. Cortical veins are   normal.    IMPRESSION: 4 mL of tissue at risk for infarction in the right temporal   lobe on CT perfusion. Normal CTA of the head and neck. No large vessel   occlusion.    --- End of Report ---            BRITT VILA MD; Attending Radiologist  This document has been electronically signed. Dec 10 2023  2:34PM    < end of copied text >      lyA1C with Estimated Average Glucose Result: 7.3: Method: Immunoassay

## 2023-12-11 NOTE — PROGRESS NOTE ADULT - PROBLEM SELECTOR PLAN 2
p/w Rt sided facial weakness with upper part of the face involved  likely Bell's palsy   c/w Prednisone 60 mg daily for 7 days  c/w Valacyclovir 1000 mg TID for 7 days p/w Rt sided facial weakness with upper part of the face involved  likely Bell's palsy   c/w Prednisone 60 mg daily for 7 days  c/w Valacyclovir 1000 mg TID for 7 days  Neurology Dr. Lopez

## 2023-12-11 NOTE — PROGRESS NOTE ADULT - PROBLEM SELECTOR PLAN 3
. AST 49, ALT 97 on admission   no abdominal pain  monitor CMP . AST 49, ALT 97 on admission   no abdominal pain  monitor CMP  hold acetaminophen

## 2023-12-11 NOTE — DISCHARGE NOTE PROVIDER - HOSPITAL COURSE
52 yrs old M, from home, ambulating independently, pmhx of HLD not on medication, pshx appendectomy, presented with Rt sided facial weakness, and posterior headache. Pt endorsed having right sided facial weakness, including decreased strength at the level of the eye unable to fully close along with facial droop noticed by his wife at about 3 PM. CT head Unremarkable noncontrast CT of the brain. No intracranial hemorrhage. CT angio brain showed  4 mL of tissue at risk for infarction in the right temporal lobe on CT perfusion. Normal CTA of the head and neck. No large vessel occlusion. Pt is admitted for CVA evaluation and management of possible Bell's palsy. Neurology Dr Lopez was consulted recommended continue valacyclovir and prednisone for a total of 7 days. Patient to follow outpatient neuro within one week of discharge.    Given the clinical course, decision was made to discharge patient with outpatient follow up   Discharge plan discussed with attending   This is only a brief summary for the whole hospital course, please follow up with EMR

## 2023-12-11 NOTE — DISCHARGE NOTE NURSING/CASE MANAGEMENT/SOCIAL WORK - NSDCPEFALRISK_GEN_ALL_CORE
For information on Fall & Injury Prevention, visit: [email or web address] OR  [email or web address] OR  https://www.cdc.gov/steadi/patient.html For information on Fall & Injury Prevention, visit: [email or web address] OR  [email or web address]-injury OR  https://www.cdc.gov/steadi/patient.html

## 2023-12-11 NOTE — DISCHARGE NOTE PROVIDER - NSDCCPCAREPLAN_GEN_ALL_CORE_FT
PRINCIPAL DISCHARGE DIAGNOSIS  Diagnosis: Bell's palsy  Assessment and Plan of Treatment: Bell's palsy is a condition that causes sudden, temporary weakness or paralysis on one side of the face. It's due to facial nerve inflammation often resolving on it's own within weeks to months. Please continue taking your prednisone and valcyclovir x 6 days. Please follow up with neuro Dr. Carr within one week of your discharge.      SECONDARY DISCHARGE DIAGNOSES  Diagnosis: Transaminitis  Assessment and Plan of Treatment: Transaminitis refers to elevated levels of certain liver enzymes, called transaminases, that are detected via a blood test.  A number of different medical conditions can cause minor or major liver damage. This causes the release of AST and ALT into the bloodstream, causing elevated levels to show up on blood tests.   Transaminitis, high levels of certain liver enzymes, is most often caused by nonalcoholic fatty liver disease as well as alcoholic liver disease. Less common causes include drug-induced liver injury, hepatitis B and hepatitis C, and hereditary hemochromatosis.  Symptoms of elevated liver enzymes may include abdominal pain or swelling, excess bleeding due to poor blood clotting, fatigue, itchy skin, leg and ankle swelling, nausea or vomiting, and yellowed skin(jaundice). Please try and not take tylenol as it can make your liver enzymes worse. Please follow up with your primary care provider to check your labs

## 2024-09-05 NOTE — PATIENT PROFILE ADULT - OVER THE PAST TWO WEEKS, HAVE YOU FELT LITTLE INTEREST OR PLEASURE IN DOING THINGS?
Problem: PAIN - ADULT  Goal: Verbalizes/displays adequate comfort level or baseline comfort level  Description: Interventions:  - Encourage patient to monitor pain and request assistance  - Assess pain using appropriate pain scale  - Administer analgesics based on type and severity of pain and evaluate response  - Implement non-pharmacological measures as appropriate and evaluate response  - Consider cultural and social influences on pain and pain management  - Notify physician/advanced practitioner if interventions unsuccessful or patient reports new pain  Outcome: Progressing     Problem: INFECTION - ADULT  Goal: Absence of fever/infection during neutropenic period  Description: INTERVENTIONS:  - Monitor WBC    Outcome: Progressing     Problem: GENITOURINARY - ADULT  Goal: Maintains or returns to baseline urinary function  Description: INTERVENTIONS:  - Assess urinary function  - Encourage oral fluids to ensure adequate hydration if ordered  - Administer IV fluids as ordered to ensure adequate hydration  - Administer ordered medications as needed  - Offer frequent toileting  - Follow urinary retention protocol if ordered  Outcome: Progressing      no

## 2025-04-20 NOTE — ED ADULT TRIAGE NOTE - CODE STROKE ACTIVATED DT
10-Dec-2023 13:46 Patient brought by ambulance from home fell complaining of right leg and right hip pain not on thinners
